# Patient Record
Sex: FEMALE | Race: WHITE | Employment: OTHER | URBAN - METROPOLITAN AREA
[De-identification: names, ages, dates, MRNs, and addresses within clinical notes are randomized per-mention and may not be internally consistent; named-entity substitution may affect disease eponyms.]

---

## 2021-09-14 DIAGNOSIS — M25.562 CHRONIC PAIN OF BOTH KNEES: Primary | ICD-10-CM

## 2021-09-14 DIAGNOSIS — G89.29 CHRONIC PAIN OF BOTH KNEES: Primary | ICD-10-CM

## 2021-09-14 DIAGNOSIS — M25.561 CHRONIC PAIN OF BOTH KNEES: Primary | ICD-10-CM

## 2021-09-15 ENCOUNTER — OFFICE VISIT (OUTPATIENT)
Dept: ORTHOPEDIC SURGERY | Age: 71
End: 2021-09-15
Payer: MEDICARE

## 2021-09-15 ENCOUNTER — HOSPITAL ENCOUNTER (OUTPATIENT)
Dept: GENERAL RADIOLOGY | Age: 71
Discharge: HOME | End: 2021-09-17
Payer: MEDICARE

## 2021-09-15 VITALS
WEIGHT: 259.8 LBS | OXYGEN SATURATION: 97 % | TEMPERATURE: 97.5 F | HEIGHT: 64 IN | BODY MASS INDEX: 44.35 KG/M2 | HEART RATE: 78 BPM | RESPIRATION RATE: 16 BRPM

## 2021-09-15 DIAGNOSIS — M17.11 PRIMARY OSTEOARTHRITIS OF RIGHT KNEE: Primary | ICD-10-CM

## 2021-09-15 DIAGNOSIS — M25.562 CHRONIC PAIN OF BOTH KNEES: ICD-10-CM

## 2021-09-15 DIAGNOSIS — M25.561 CHRONIC PAIN OF BOTH KNEES: ICD-10-CM

## 2021-09-15 DIAGNOSIS — M17.12 PRIMARY OSTEOARTHRITIS OF LEFT KNEE: ICD-10-CM

## 2021-09-15 DIAGNOSIS — G89.29 CHRONIC PAIN OF BOTH KNEES: ICD-10-CM

## 2021-09-15 PROCEDURE — 73565 X-RAY EXAM OF KNEES: CPT | Performed by: ORTHOPAEDIC SURGERY

## 2021-09-15 PROCEDURE — 20610 DRAIN/INJ JOINT/BURSA W/O US: CPT | Performed by: ORTHOPAEDIC SURGERY

## 2021-09-15 PROCEDURE — 99204 OFFICE O/P NEW MOD 45 MIN: CPT | Performed by: ORTHOPAEDIC SURGERY

## 2021-09-15 PROCEDURE — 73565 X-RAY EXAM OF KNEES: CPT

## 2021-09-15 RX ORDER — PHENOL 1.4 %
1 AEROSOL, SPRAY (ML) MUCOUS MEMBRANE DAILY
COMMUNITY

## 2021-09-15 RX ORDER — M-VIT,TX,IRON,MINS/CALC/FOLIC 27MG-0.4MG
1 TABLET ORAL DAILY
COMMUNITY

## 2021-09-15 RX ORDER — FAMOTIDINE 20 MG/1
20 TABLET, FILM COATED ORAL 2 TIMES DAILY
COMMUNITY

## 2021-09-15 RX ORDER — LIDOCAINE HYDROCHLORIDE 10 MG/ML
8 INJECTION, SOLUTION INFILTRATION; PERINEURAL ONCE
Status: SHIPPED | OUTPATIENT
Start: 2021-09-15

## 2021-09-15 RX ORDER — ATORVASTATIN CALCIUM 10 MG/1
10 TABLET, FILM COATED ORAL DAILY
COMMUNITY

## 2021-09-15 RX ORDER — LEVOTHYROXINE SODIUM 112 UG/1
112 TABLET ORAL DAILY
COMMUNITY

## 2021-09-15 RX ORDER — AMOXICILLIN 500 MG
CAPSULE ORAL
COMMUNITY

## 2021-09-15 RX ORDER — TRIAMCINOLONE ACETONIDE 40 MG/ML
80 INJECTION, SUSPENSION INTRA-ARTICULAR; INTRAMUSCULAR ONCE
Status: SHIPPED | OUTPATIENT
Start: 2021-09-15

## 2021-09-15 RX ORDER — VITAMIN B COMPLEX
1 CAPSULE ORAL DAILY
COMMUNITY

## 2021-09-15 RX ORDER — ACETAMINOPHEN 650 MG/1
650 SUPPOSITORY RECTAL EVERY 4 HOURS PRN
COMMUNITY

## 2021-09-15 RX ORDER — ACETAMINOPHEN 160 MG
TABLET,DISINTEGRATING ORAL
COMMUNITY

## 2021-09-15 ASSESSMENT — ENCOUNTER SYMPTOMS
EYE DISCHARGE: 0
EYE ITCHING: 0
COUGH: 0
SHORTNESS OF BREATH: 0
DIARRHEA: 0
ABDOMINAL PAIN: 0
CONSTIPATION: 0
EYE PAIN: 0

## 2021-09-15 NOTE — PROGRESS NOTES
Patient ID:  Rip Camacho Case is a 73 y.o. female who presents today for evaluation of bilateral knee pain. She has a longstanding history of bilateral knee osteoarthritis. She has had steroid injections in each knee, to her best recollection 3 on the left and 2 on the right, but that was a number of years ago. She has kidney issues and is avoiding anti-inflammatories.     Injury: no  Metal Allergy: no    Location: bilateral knee pain, located on the global aspect of the knee  Pain: yes; 7 on a scale of 1 to 10  Onset: gradual  Duration: 10+ years  Frequency:  occurs daily  Quality: aching and boring   Swelling: patient notes intermittent swelling of the joint  Aggravating factors: weight bearing activity, standing and walking  Alleviating factors: removing weight from leg and rest  Mechanical symptoms: catching and grinding  Radiation: no    Activities: walking independently  Restriction:  decreased ambulatory tolerance  Progression:  worsening    Previous treatment:  steroid injection   NSAIDs:  She has an elevated GFR and she is avoiding anti-inflammatories  PT:  none    Medications:    Current Outpatient Medications on File Prior to Visit   Medication Sig Dispense Refill    levothyroxine (SYNTHROID) 112 MCG tablet Take 112 mcg by mouth Daily      atorvastatin (LIPITOR) 10 MG tablet Take 10 mg by mouth daily Pt takes at night      calcium carbonate 600 MG TABS tablet Take 1 tablet by mouth daily      Multiple Vitamins-Minerals (THERAPEUTIC MULTIVITAMIN-MINERALS) tablet Take 1 tablet by mouth daily      Cholecalciferol (VITAMIN D3) 50 MCG (2000 UT) CAPS Take by mouth      b complex vitamins capsule Take 1 capsule by mouth daily w/ vit c      Omega-3 Fatty Acids (FISH OIL) 1200 MG CAPS Take by mouth      sodium chloride nebulizer 0.9 % NEBU 30 mL with albuterol (5 MG/ML) 0.5% NEBU Inhale into the lungs once As needed      famotidine (PEPCID) 20 MG tablet Take 20 mg by mouth 2 times daily      acetaminophen (TYLENOL) 650 MG suppository Place 650 mg rectally every 4 hours as needed for Fever As needed for pain       No current facility-administered medications on file prior to visit. Allergies: Allergies   Allergen Reactions    Omeprazole Diarrhea       Past Medical History:    History reviewed. No pertinent past medical history. Past Surgical History:    Past Surgical History:   Procedure Laterality Date    HYSTEROSCOPY  06/04/2021    w/ polypectomy    WRIST FRACTURE SURGERY  2005    left 2 pins placed       Social History:    Social History     Socioeconomic History    Marital status:      Spouse name: Not on file    Number of children: Not on file    Years of education: Not on file    Highest education level: Not on file   Occupational History    Not on file   Tobacco Use    Smoking status: Never Smoker    Smokeless tobacco: Never Used   Vaping Use    Vaping Use: Never used   Substance and Sexual Activity    Alcohol use: Yes     Comment: rare    Drug use: Not on file    Sexual activity: Not on file   Other Topics Concern    Not on file   Social History Narrative    Not on file     Social Determinants of Health     Financial Resource Strain:     Difficulty of Paying Living Expenses:    Food Insecurity:     Worried About Running Out of Food in the Last Year:     [address] in the Last Year:    Transportation Needs:     Lack of Transportation (Medical):      Lack of Transportation (Non-Medical):    Physical Activity:     Days of Exercise per Week:     Minutes of Exercise per Session:    Stress:     Feeling of Stress :    Social Connections:     Frequency of Communication with Friends and Family:     Frequency of Social Gatherings with Friends and Family:     Attends [employer] Services:     Active Member of Clubs or Organizations:     Attends Club or Organization Meetings:     Marital Status:    Intimate Partner Violence:     Fear of Current or Ex-Partner:     Emotionally Abused:     Physically Abused:     Sexually Abused:        Family History:    Family History   Problem Relation Age of Onset    Hypertension Mother     No Known Problems Father        Occupation:  retired   - Occupational requirements:  none    Workers Compensation:  Have you missed work for this issue?  no  Is this issue being addressed under a worker's comp claim? no    Review of Systems:    Review of Systems   Constitutional: Negative for activity change, appetite change and chills. HENT: Negative for congestion, ear pain and hearing loss. Eyes: Negative for pain, discharge and itching. Respiratory: Negative for cough and shortness of breath. Cardiovascular: Negative for chest pain and leg swelling. Gastrointestinal: Negative for abdominal pain, constipation and diarrhea. Endocrine: Negative for cold intolerance, heat intolerance and polydipsia. Genitourinary: Negative for difficulty urinating, flank pain and frequency. Skin: Negative for rash and wound. Allergic/Immunologic: Negative for environmental allergies and food allergies. Neurological: Negative for dizziness, seizures and syncope. Physical Exam:    Examination of the bilateral knee    Gait:  waddling due to bilateral pain  Inspection:  Valgus on the right and varus on the left  Swelling:  none  Erythema:  none  Ecchymosis:  none  Effusion:  1+  Palpation:  distal medial femoral condyle, distal lateral femoral condyle and Lateral joint space on the right medial joint space on the left  Extension:  5 degrees on the right and 6 degrees on the left  Flexion:  110 on the right 107 on the left  Strength:  5  Varus/Valgus Instability:  none  Anterior/Posterior Instability:  none  Corie:  positive  Thessaly:  positive  Modified Apley:  negative  Lachman:  negative  Patellar compression:  negative  Neurological/Vascular:  Sensation grossly intact.   Dorsalis pedis palpable and 2+    Radiographs:  Radiographs of the bilateral knee were personally reviewed, and they revealed:   XR KNEE BILATERAL STANDING    Result Date: 9/15/2021  Radiographs of the bilateral knees were personally reviewed. The patient has severe bilateral knee osteoarthritis. On the right side she has lateral compartment bone-on-bone contact with some medial joint space maintenance. She has significant patellofemoral osteophytes laterally. On the left side, the patient has medial compartment bone-on-bone contact with some lateral joint space maintenance. She also has significant patellofemoral osteophytes. No evidence of any fractures. No evidence of any bone or soft tissue lesions. MRI: None    Diagnosis:   Diagnosis Orders   1. Primary osteoarthritis of right knee  Ambulatory referral to Physical Therapy    [place] ARTHROCENTESIS ASPIR&/INJ MAJOR JT/BURSA W/O US   2. Primary osteoarthritis of left knee  Ambulatory referral to Physical Therapy    [place] ARTHROCENTESIS ASPIR&/INJ MAJOR JT/BURSA W/O US       Plan:    The patient has significant osteoarthritis of the knees bilaterally. Treatment options were discussed. Patient revealed that she had an abnormal EKG and needs to see a cardiologist.  Her BMI is presently 44. So, treatment options were discussed. Patient decided to try bilateral knee intra-articular steroid injections in conjunction with water therapy. This will provide her with an opportunity to exercise without joint reactive forces in an attempt to lose weight. This will also strengthen the muscles around the knee. She will follow-up as needed as the pain dictates. We did discuss weight loss strategies.     Time Out  [x] Patient Verified  [x] Site Verified  [x] Laterality Verified  [x] Procedure Verified    Before aspiration/injection risks/benefits of a cortisone injection including infection, local skin irritation, skin atrophy, continued pain/discomfort, elevated blood sugar, burning, failure to relieve pain, possible late infection were discussed with patient. Patient verbalized understanding and wanted to proceed with planned procedure. After prepping and draping the left knee in the usual sterile fashion, 1 cc of 40 mg/ml kenalog with 8 cc of 1% lidocaine were injected intra-articularly without any complications. Patient tolerated this well. After prepping and draping the right knee in the usual sterile fashion, 1 cc of 40 mg/ml kenalog with 8 cc of 1% lidocaine were injected intra-articularly without any complications. Patient tolerated this well. Post-procedure discomfort can be alleviated with additional medications/ice/elevation/rest over the first 24 hours as recommended. The patient tolerated the procedure well. If fluid was aspirated it was sent for further studies  in sterile specimen container as ordered to the lab    Orders Placed This Encounter   Procedures    Ambulatory referral to Physical Therapy     Referral Priority:   Routine     Referral Type:   Eval and Treat     Referral Reason:   Specialty Services Required     Requested Specialty:   Physical Therapy     Number of Visits Requested:   1    [place] ARTHROCENTESIS ASPIR&/INJ MAJOR JT/BURSA W/O US    [place] ARTHROCENTESIS ASPIR&/INJ MAJOR JT/BURSA W/O US       Orders Placed This Encounter   Medications    lidocaine 1 % injection 8 mL    triamcinolone acetonide (KENALOG-40) injection 80 mg    lidocaine 1 % injection 8 mL    triamcinolone acetonide (KENALOG-40) injection 80 mg       Return if symptoms worsen or fail to improve.     Marian Parr MD

## 2021-10-01 ENCOUNTER — HOSPITAL ENCOUNTER (OUTPATIENT)
Dept: PHYSICAL THERAPY | Age: 71
Setting detail: THERAPIES SERIES
Discharge: HOME | End: 2021-10-01
Payer: MEDICARE

## 2021-10-01 PROCEDURE — 97161 PT EVAL LOW COMPLEX 20 MIN: CPT

## 2021-10-01 NOTE — PROGRESS NOTES
[address]  PHYSICAL THERAPY EVALUATION    Date: 10/1/2021  Patient Name: Aristeo Bar       MRN: [id number]   Account: [de-identified]   : 1950  (73 y.o.)   Gender: female   Referring Practitioner: Jony Parra MD                 Diagnosis: primary OA of right knee; primary OA of left knee  Treatment Diagnosis: mari knee pain, decreased knee ROM mari, decreased LE strength mari, impaired gait and standing activity tolerance           Past Medical History:  has no past medical history on file. Past Surgical History:   has a past surgical history that includes hysteroscopy (2021) and Wrist fracture surgery (). Vital Signs  Patient Currently in Pain: No   Pain Screening  Patient Currently in Pain: No        Lives With: Spouse  Type of Home: House  Home Layout: Two level; Able to Live on Main level with bedroom/bathroom  Home Access: Stairs to enter with rails  Entrance Stairs - Number of Steps: 4  Entrance Stairs - Rails: Right  Bathroom Shower/Tub: Walk-in shower  Home Equipment: Cane (PRN)  ADL Assistance: Independent  Homemaking Assistance: Independent  Ambulation Assistance: Independent  Transfer Assistance: Independent  Active : Yes  Occupation: Retired  Type of occupation: L.P.N.  [address]: walking the dog, flower gardening  Additional Comments: standing tolerance: 10 minutes        Subjective:  Subjective: Pt reports ongoing chronic knee pain, L worse than R. Has been receiving conservative tx including cortisone injections. Denies falls over the last 6 months but has difficulty standing and ambulating for long periods of time. Pt recommended for aquatics to progress mari knee ROM and strength as well as secondary weight management in anticipation of knee replacement surgery. Pt recommended by MD to lose 30 pounds. Pt uses cane PRN for long community ambulation.        Objective:   Ambulation 1  Surface: carpet  Device: No Device  Assistance: Independent  Gait Deviations: Slow Shannan (lateral trunk sway)  Distance: 50-75ft intervals  Stairs  # Steps : 4  Rails: Bilateral  Device: No Device  Assistance: Modified independent   Comment: reciprocal ascending; leads down with LLE     Transfers  Sit to Stand: Independent  Stand to sit: Independent    Strength RLE  Strength RLE: Exception  R Hip Flexion: 4-/5  R Hip Extension: 3+/5  R Hip ABduction: 3+/5  R Hip ADduction: 3+/5  R Knee Flexion: 4-/5  R Knee Extension: 4-/5  Strength LLE  Strength LLE: Exception  L Hip Flexion: 4-/5  L Hip Extension: 3+/5  L Hip ABduction: 3+/5  L Hip ADduction: 3+/5  L Knee Flexion: 4-/5  L Knee Extension: 4-/5        Strength Other  Other: 5x STS: 11 seconds       AROM RLE (degrees)  RLE AROM: WFL  RLE General AROM: Knee: 3-95, supine (pain at end range flexion)     AROM LLE (degrees)  LLE AROM : WFL  LLE General AROM: Knee: 5-95, supine (pain at end range flexion)       Observation/Palpation  Observation: LE windswept L (R knee valgus, L knee varus)      Exercises:   Exercises  Exercise 1: *aquatics  Exercise 2: *gait laps  Exercise 3: *sink ex  Exercise 4: *knee flexion stretch at steps  Exercise 5: *deep water bicycles  Exercise 6: *deep water flutter kicks  Exercise 7: *step ups  Exercise 8: *squats  Exercise 9: *SLS/balance work  *Indicates exercise,modality, or manual techniques to be initiated when appropriate    Assessment: Body structures, Functions, Activity limitations: Increased pain, Decreased ROM, Decreased strength, Decreased balance, Decreased functional mobility , Decreased high-level IADLs  Assessment: Pt is a 72 yo female referred for aquatic therapy for mari knee OA. Goal to progress ROM, strength, and weight management in prep for future TKRs. Pt presents with impaired knee ROM and LE strength mari. Function gait tolerance limited to 10 minutes secondary to pain.  Pt can benefit from PT services to establish a personalized aquatic HEP to facilitate ROM, to continue upon discharge  Long term goal 2: Improve mari knee flexion ROM > 110 deg  Long term goal 3: Improve mari strength >/= 1/2 MMT grade for improved LE stability with gait and stair negotiation         PT Individual Minutes  Time In: 1426  Time Out: 1507  Minutes: 41     Procedure Minutes: 41 min eval       Electronically signed by Marc Estevez, PT on 10/1/21 at 3:14 PM EDT

## 2021-10-01 NOTE — PROGRESS NOTES
[address]     Ph: [phone]  Fax: [phone]    [x] Certification  [] Recertification [x]  Plan of Care  [] Progress Note [] Discharge      To:  Enma Schaefer MD      From:  Kendrick Albright, PT, DPT  Patient: Alondra Callahan Case     : 1950  Diagnosis: primary OA of right knee; primary OA of left knee     Date: 10/1/2021  Treatment Diagnosis: mari knee pain, decreased knee ROM mari, decreased LE strength mari, impaired gait and standing activity tolerance    Plan of Care/Certification Expiration Date: 21  Progress Report Period from:  10/1/2021  to 10/1/2021    Total # of Visits to Date: 1   No Show: 0    Canceled Appointment: 0     OBJECTIVE:   Long Term Goals - Time Frame for Long term goals : 4 weeks  Goals Current/ Discharge status Met   Long term goal 1: Independent with aquatic HEP to continue upon discharge HEP to be established [] yes  [x] no   Long term goal 2: Improve mari knee flexion ROM > 110 deg AROM RLE (degrees)  RLE AROM: WFL  RLE General AROM: Knee: 3-95, supine (pain at end range flexion)     AROM LLE (degrees)  LLE AROM : WFL  LLE General AROM: Knee: 5-95, supine (pain at end range flexion)     [] yes  [x] no   Long term goal 3: Improve mari strength >/= 1/2 MMT grade for improved LE stability with gait and stair negotiation Strength RLE  Strength RLE: Exception  R Hip Flexion: 4-/5  R Hip Extension: 3+/5  R Hip ABduction: 3+/5  R Hip ADduction: 3+/5  R Knee Flexion: 4-/5  R Knee Extension: 4-/5     Strength LLE  Strength LLE: Exception  L Hip Flexion: 4-/5  L Hip Extension: 3+/5  L Hip ABduction: 3+/5  L Hip ADduction: 3+/5  L Knee Flexion: 4-/5  L Knee Extension: 4-/5        Strength Other  Other: 5x STS: 11 seconds   [] yes  [x] no       Body structures, Functions, Activity limitations: Increased pain, Decreased ROM, Decreased strength, Decreased balance, Decreased functional mobility ,

## 2021-10-12 ENCOUNTER — HOSPITAL ENCOUNTER (OUTPATIENT)
Dept: PHYSICAL THERAPY | Age: 71
Setting detail: THERAPIES SERIES
Discharge: HOME | End: 2021-10-12
Payer: MEDICARE

## 2021-10-12 PROCEDURE — 97113 AQUATIC THERAPY/EXERCISES: CPT

## 2021-10-12 NOTE — PROGRESS NOTES
[address] [address]  Outpatient Physical Therapy    Treatment Note        Date: 10/12/2021  Patient: Davis Arzate Case  : 1950  ACCT #: [de-identified]  Referring Practitioner: Pattie Ruby MD  Diagnosis: primary OA of right knee; primary OA of left knee  Treatment Diagnosis: mari knee pain, decreased knee ROM mari, decreased LE strength mari, impaired gait and standing activity tolerance    Visit Information:  PT Visit Information  PT Insurance Information: [facility] Medicare  Total # of Visits to Date: 2  Plan of Care/Certification Expiration Date: 21  No Show: 0  Canceled Appointment: 0  Progress Note Counter:     Subjective: Pt report no pain at rest but with prolonged standing or walking will riase to 8/10 worst.     HEP Compliance:  [x] Good [] Fair [] Poor [] Reports not doing due to:    Vital Signs  Patient Currently in Pain: No   Pain Screening  Patient Currently in Pain: No    OBJECTIVE:   Exercises  Exercise 1: aquatics  Exercise 2: gait laps FLR x 3  Exercise 3: sink ex x 10  Exercise 4: knee flexion stretch at steps 30 sec x 3 B  Exercise 7: step ups F /L x 10          Strength: [x] NT  [] MMT completed:     ROM: [x] NT  [] ROM measurements:     *Indicates exercise, modality, or manual techniques to be initiated when appropriate    Assessment: Body structures, Functions, Activity limitations: Increased pain, Decreased ROM, Decreased strength, Decreased balance, Decreased functional mobility , Decreased high-level IADLs  Assessment: Intiated aquatic exercise program as stated in exercise section without complaint of pain post session. Did expirience pain with retro stepping which was eleviated with shorter strides.   Treatment Diagnosis: mari knee pain, decreased knee ROM mari, decreased LE strength mari, impaired gait and standing activity tolerance          Goals:       Long term goals  Time Frame for Long term goals : 4 weeks  Long term goal 1: Independent with aquatic HEP to continue upon discharge  Long term goal 2: Improve mari knee flexion ROM > 110 deg  Long term goal 3: Improve mari strength >/= 1/2 MMT grade for improved LE stability with gait and stair negotiation  Progress toward goals: Progressing towards goals. POST-PAIN       Pain Rating (0-10 pain scale):  0 /10   Location and pain description same as pre-treatment unless indicated. Action: [] NA   [] Perform HEP  [] Meds as prescribed  [] Modalities as prescribed   [] Call Physician     Frequency/Duration:  Plan  Times per week: 2  Plan weeks: 4  Current Treatment Recommendations: Aquatics, ROM, Strengthening, Balance Training, Home Exercise Program     Pt to continue current HEP. See objective section for any therapeutic exercise changes, additions or modifications this date.          PT Individual Minutes  Time In: 0163  Time Out: 1034  Minutes: 39  Timed Code Treatment Minutes: 39 Minutes  Procedure Minutes:0     Timed Activity Minutes Units   Aquatics 39 3     Signature:  Electronically signed by Pastor Medina PTA on 10/12/21 at 10:38 AM EDT

## 2021-10-15 ENCOUNTER — HOSPITAL ENCOUNTER (OUTPATIENT)
Dept: PHYSICAL THERAPY | Age: 71
Setting detail: THERAPIES SERIES
Discharge: HOME | End: 2021-10-15
Payer: MEDICARE

## 2021-10-15 PROCEDURE — 97113 AQUATIC THERAPY/EXERCISES: CPT

## 2021-10-15 NOTE — PROGRESS NOTES
[address] [address]  Outpatient Physical Therapy    Treatment Note        Date: 10/15/2021  Patient: Alpesh Pryor Case  : 1950  ACCT #: [de-identified]  Referring Practitioner: Narendra Rodriguez MD  Diagnosis: primary OA of right knee; primary OA of left knee       Visit Information:  PT Visit Information  PT Insurance Information: [place] [employer]  Total # of Visits to Date: 3  Plan of Care/Certification Expiration Date: 21  No Show: 0  Canceled Appointment: 0  Progress Note Counter: 3/9    Subjective: Patient denies pain/soreness after last visit. Continues to c/o difficulty ascending/descending stairs. HEP Compliance:  [x] Good [] Fair [] Poor [] Reports not doing due to:    Vital Signs  Patient Currently in Pain: No   Pain Screening  Patient Currently in Pain: No    OBJECTIVE:   Exercises  Exercise 1: aquatics  Exercise 2: gait laps FLR x 3  Exercise 3: sink ex x 10  Exercise 4: knee flexion stretch at steps 30 sec x 3 B  Exercise 5: deep water bicycles 5 minutes - without noodle  Exercise 6: deep water flutter kicks 2' forward/lateral  *Indicates exercise, modality, or manual techniques to be initiated when appropriate    Assessment:   Assessment: Continued aquatic program progressing exercises for bilateral knee ROM and strength. Patient denies pain with additional exercises. Goals:       Long term goals  Time Frame for Long term goals : 4 weeks  Long term goal 1: Independent with aquatic HEP to continue upon discharge  Long term goal 2: Improve mari knee flexion ROM > 110 deg  Long term goal 3: Improve mari strength >/= 1/2 MMT grade for improved LE stability with gait and stair negotiation  Progress toward goals:continue towards all     POST-PAIN       Pain Rating (0-10 pain scale):   0/10   Location and pain description same as pre-treatment unless indicated.    Action: [] NA   [] Perform HEP  [] Meds as prescribed  [] Modalities as prescribed   [] Call Physician     Frequency/Duration:  Plan  Times per

## 2021-10-19 ENCOUNTER — HOSPITAL ENCOUNTER (OUTPATIENT)
Dept: PHYSICAL THERAPY | Age: 71
Setting detail: THERAPIES SERIES
Discharge: HOME | End: 2021-10-19
Payer: MEDICARE

## 2021-10-19 PROCEDURE — 97113 AQUATIC THERAPY/EXERCISES: CPT

## 2021-10-19 ASSESSMENT — PAIN DESCRIPTION - PAIN TYPE: TYPE: CHRONIC PAIN

## 2021-10-19 ASSESSMENT — PAIN DESCRIPTION - DESCRIPTORS: DESCRIPTORS: ACHING

## 2021-10-19 ASSESSMENT — PAIN DESCRIPTION - LOCATION: LOCATION: KNEE

## 2021-10-19 ASSESSMENT — PAIN SCALES - GENERAL: PAINLEVEL_OUTOF10: 5

## 2021-10-19 ASSESSMENT — PAIN DESCRIPTION - ORIENTATION: ORIENTATION: RIGHT

## 2021-10-19 NOTE — PROGRESS NOTES
[address] [address]  Outpatient Physical Therapy    Treatment Note        Date: 10/19/2021  Patient: James Russell Case  : 1950  ACCT #: [de-identified]  Referring Practitioner: Maci Elam MD  Diagnosis: primary OA of right knee; primary OA of left knee       Visit Information:  PT Visit Information  PT Insurance Information: [facility] Medicare  Total # of Visits to Date: 4  Plan of Care/Certification Expiration Date: 21  No Show: 0  Canceled Appointment: 0  Progress Note Counter:     Subjective: Patient denies pain/soreness after last visit. Continues to c/o difficulty ascending/descending stairs. HEP Compliance:  [x] Good [] Fair [] Poor [] Reports not doing due to:    Vital Signs  Patient Currently in Pain: Yes   Pain Screening  Patient Currently in Pain: Yes  Pain Assessment  Pain Assessment: 0-10  Pain Level: 5  Pain Type: Chronic pain  Pain Location: Knee  Pain Orientation: Right  Pain Descriptors: Aching    OBJECTIVE:   Exercises  Exercise 1: aquatics  Exercise 2: gait laps FLR x 3  Exercise 3: sink ex x 10  Exercise 4: knee flexion stretch at steps 30 sec x 3 B  Exercise 5: deep water bicycles 5 minutes - without noodle  Exercise 6: deep water flutter kicks 2' forward/lateral         Strength: [x] NT  [] MMT completed:    ROM: [x] NT  [] ROM measurements:  Assessment: Body structures, Functions, Activity limitations: Increased pain, Decreased ROM, Decreased strength, Decreased balance, Decreased functional mobility , Decreased high-level IADLs  Assessment: Pt continued to progress through aquatic exercises this date with no pain reported throughout session. Pt required vc's and demonstrations this date for proper execution of  sink exercises.   Goals:    Long term goals  Time Frame for Long term goals : 4 weeks  Long term goal 1: Independent with aquatic HEP to continue upon discharge  Long term goal 2: Improve mari knee flexion ROM > 110 deg  Long term goal 3: Improve mari strength >/= 1/2 MMT grade for improved LE stability with gait and stair negotiation  Progress toward goals: Pt is progressing towards improving stability and ROM in LW    POST-PAIN       Pain Rating (0-10 pain scale):  0 /10   Location and pain description same as pre-treatment unless indicated. Action: [x] NA   [] Perform HEP  [] Meds as prescribed  [] Modalities as prescribed   [] Call Physician     Frequency/Duration:  Plan  Times per week: 2  Plan weeks: 4  Current Treatment Recommendations: Aquatics, ROM, Strengthening, Balance Training, Home Exercise Program     Pt to continue current HEP. See objective section for any therapeutic exercise changes, additions or modifications this date.          PT Individual Minutes  Time In: 6708  Time Out: 9858  Minutes: 34  Timed Code Treatment Minutes: 34 Minutes  Procedure Minutes:     Timed Activity Minutes Units   Aquatic Ther 34 2       Signature:  Electronically signed by Hiro Valdez PTA on 10/19/21 at 2:26 PM EDT

## 2021-10-22 ENCOUNTER — HOSPITAL ENCOUNTER (OUTPATIENT)
Dept: PHYSICAL THERAPY | Age: 71
Setting detail: THERAPIES SERIES
Discharge: HOME | End: 2021-10-22
Payer: MEDICARE

## 2021-10-22 PROCEDURE — 97113 AQUATIC THERAPY/EXERCISES: CPT

## 2021-10-22 ASSESSMENT — PAIN DESCRIPTION - ORIENTATION: ORIENTATION: RIGHT

## 2021-10-22 ASSESSMENT — PAIN DESCRIPTION - DESCRIPTORS: DESCRIPTORS: ACHING

## 2021-10-22 ASSESSMENT — PAIN SCALES - GENERAL: PAINLEVEL_OUTOF10: 3

## 2021-10-22 ASSESSMENT — PAIN DESCRIPTION - LOCATION: LOCATION: KNEE

## 2021-10-22 ASSESSMENT — PAIN DESCRIPTION - PAIN TYPE: TYPE: CHRONIC PAIN

## 2021-10-22 NOTE — PROGRESS NOTES
110 deg  Long term goal 3: Improve mari strength >/= 1/2 MMT grade for improved LE stability with gait and stair negotiation  Progress toward goals: Pt progressing towards goals 2 and 3    POST-PAIN       Pain Rating (0-10 pain scale):   /10   Location and pain description same as pre-treatment unless indicated. Action: [x] NA   [] Perform HEP  [] Meds as prescribed  [] Modalities as prescribed   [] Call Physician     Frequency/Duration:  Plan  Times per week: 2  Plan weeks: 4  Current Treatment Recommendations: Aquatics, ROM, Strengthening, Balance Training, Home Exercise Program     Pt to continue current HEP. See objective section for any therapeutic exercise changes, additions or modifications this date.          PT Individual Minutes  Time In: 2101  Time Out: 9808  Minutes: 39  Timed Code Treatment Minutes: 39 Minutes  Procedure Minutes:     Timed Activity Minutes Units   Aquatic 39 3     Signature:  Electronically signed by Lauri Rodriguez PTA on 10/22/21 at 1:35 PM EDT

## 2021-10-25 ENCOUNTER — HOSPITAL ENCOUNTER (OUTPATIENT)
Dept: PHYSICAL THERAPY | Age: 71
Setting detail: THERAPIES SERIES
Discharge: HOME | End: 2021-10-25
Payer: MEDICARE

## 2021-10-25 PROCEDURE — 97113 AQUATIC THERAPY/EXERCISES: CPT

## 2021-10-25 NOTE — PROGRESS NOTES
[address] [address]  Outpatient Physical Therapy    Treatment Note        Date: 10/25/2021  Patient: Nuris Roca Case  : 1950  ACCT #: [de-identified]  Referring Practitioner: Riki Wen MD  Diagnosis: primary OA of right knee; primary OA of left knee       Visit Information:  PT Visit Information  PT Insurance Information: [facility] Medicare  Total # of Visits to Date: 6  Plan of Care/Certification Expiration Date: 21  No Show: 0  Canceled Appointment: 0  Progress Note Counter:     Subjective: No pain currently while seated on pool bench however, pt states \"The had me doing the squats last time I was here, I was so sore after that. I felt clickling and grinding in both knees. \" Pt requesting to avoid this exercise. HEP Compliance:  [x] Good [] Fair [] Poor [] Reports not doing due to:    Vital Signs  Patient Currently in Pain: Denies   Pain Screening  Patient Currently in Pain: Denies    OBJECTIVE:   Exercises  Exercise 1: aquatics  Exercise 2: gait laps FLR x 3  Exercise 3: sink ex x 15  Exercise 4: knee flexion stretch at steps 30 sec x 3 B  Exercise 5: deep water bicycles 5 minutes - without noodle  Exercise 6: deep water flutter kicks 2' forward/lateral  Exercise 8: squats- pt requesting to hold D/T inc knee pain after LV. Strength: [x] NT  [] MMT completed:     ROM: [x] NT  [] ROM measurements:    Assessment: Body structures, Functions, Activity limitations: Increased pain, Decreased ROM, Decreased strength, Decreased balance, Decreased functional mobility , Decreased high-level IADLs  Assessment: Cont'd current pool program for inc B knee ROM and LE strength w/ avoidence of squatting ex to limit pain, good rose to tx. Pt requesting to be discharged from aquatic PT NV D/T closure of therapy pool beginning  (facility undergoing construction).  Pt was offerred option of \"big pool\" during select times or transition to land based PT, no interest. Appropriate HEP to be reviewed and provided NV. Pt [facility] [place] [facility] if wishing to utilize pool following D/C. Goals:   Long term goals  Time Frame for Long term goals : 4 weeks  Long term goal 1: Independent with aquatic HEP to continue upon discharge  Long term goal 2: Improve mari knee flexion ROM > 110 deg  Long term goal 3: Improve mari strength >/= 1/2 MMT grade for improved LE stability with gait and stair negotiation  Progress toward goals: B knee ROM/strength     POST-PAIN       Pain Rating (0-10 pain scale):   0/10   Location and pain description same as pre-treatment unless indicated. Action: [x] NA   [] Perform HEP  [] Meds as prescribed  [] Modalities as prescribed   [] Call Physician     Frequency/Duration:  Plan  Times per week: 2  Plan weeks: 4  Specific instructions for Next Treatment: D/C NV. (per pt request)  Current Treatment Recommendations: Aquatics, ROM, Strengthening, Balance Training, Home Exercise Program     Pt to continue current HEP. See objective section for any therapeutic exercise changes, additions or modifications this date.     PT Individual Minutes  Time In: 9765  Time Out: 5410  Minutes: 38  Timed Code Treatment Minutes: 38 Minutes  Procedure Minutes: N/A     Timed Activity Minutes Units   AQ 38 3     Signature:  Electronically signed by Russ Jackson PTA on 10/25/21 at 1:19 PM EDT

## 2021-10-28 ENCOUNTER — HOSPITAL ENCOUNTER (OUTPATIENT)
Dept: PHYSICAL THERAPY | Age: 71
Setting detail: THERAPIES SERIES
Discharge: HOME | End: 2021-10-28
Payer: MEDICARE

## 2021-10-28 PROCEDURE — 97140 MANUAL THERAPY 1/> REGIONS: CPT

## 2021-10-28 PROCEDURE — 97110 THERAPEUTIC EXERCISES: CPT

## 2021-10-28 NOTE — PROGRESS NOTES
15 min     Assessment: Body structures, Functions, Activity limitations: Increased pain, Decreased ROM, Decreased strength, Decreased balance, Decreased functional mobility , Decreased high-level IADLs  Assessment: Pt has completed 3 wks of aquatic therapy geared towards improving LE strength and B knee ROM to ease functional tolerance. Pt continuing to make good progress towards these goals however, wishing to D/C from skilled PT and continue on w/ indep program at this time. Pt verbalizing 75% function, inc self confidence/steadiness in community and ability to amb short distances around home w/o use of SC. Pt reports being well aware of her limitations and will to continue to be diligent w/ land/pool programs provided. Goals:   Long term goals  Time Frame for Long term goals : 4 weeks  Long term goal 1: Independent with aquatic HEP to continue upon discharge  Long term goal 2: Improve mari knee flexion ROM > 110 deg  Long term goal 3: Improve mari strength >/= 1/2 MMT grade for improved LE stability with gait and stair negotiation  Progress toward goals: Please refer to D/C note for details. POST-PAIN       Pain Rating (0-10 pain scale):   0/10   Location and pain description same as pre-treatment unless indicated. Action: [] NA   [x] Perform HEP  [] Meds as prescribed  [x] Modalities as prescribed   [] Call Physician     Frequency/Duration:  Plan  Plan Comment: D/C this date. Pt to continue current HEP. See objective section for any therapeutic exercise changes, additions or modifications this date.     PT Individual Minutes  Time In: 6381  Time Out: 9723  Minutes: 38  Timed Code Treatment Minutes: 38 Minutes  Procedure Minutes: N/A      Timed Activity Minutes Units   Ther Ex 23 2   Manual  15 1       Signature:  Electronically signed by Nay Murray PTA on 10/28/21 at 5:08 PM EDT

## 2021-10-28 NOTE — PROGRESS NOTES
[address]     Ph: [phone]  Fax: [phone]    [] Certification  [] Recertification []  Plan of Care  [] Progress Note [x] Discharge      To:  Ayanna Peoples MD      From:  Nelsy Smith, PT, DPT  Patient: Reyes Apa Case     : 1950  Diagnosis: primary OA of right knee; primary OA of left knee     Date: 10/28/2021       Plan of Care/Certification Expiration Date: 21  Progress Report Period from:  10/1/2021  to 10/28/2021    Total # of Visits to Date: 7   No Show: 0    Canceled Appointment: 0     OBJECTIVE:   Long Term Goals - Time Frame for Long term goals : 4 weeks  Goals Current/ Discharge status Met   Long term goal 1: Independent with aquatic HEP to continue upon discharge Indep/compliant w/ land/pool programs  [x] yes  [] no   Long term goal 2: Improve mari knee flexion ROM > 110 deg PROM RLE (degrees)  RLE General PROM: 100 deg flexion  AROM RLE (degrees)  RLE AROM: WFL  RLE General AROM: Knee: 0-98, supine (pain at end range flexion),  PROM LLE (degrees)  LLE General PROM: 100 deg flexion in seated  AROM LLE (degrees)  LLE AROM : WFL  LLE General AROM: Knee: 0-90, supine (no pain at end range flexion), 95 deg in seated   [x] yes  [x] no   Long term goal 3: Improve mari strength >/= 1/2 MMT grade for improved LE stability with gait and stair negotiation Strength RLE  Strength RLE: Exception  R Hip Flexion: 4/5  R Hip Extension: 4/5  R Hip ABduction: 4-/5  R Hip ADduction: 3+/5  R Knee Flexion: 4+/5  R Knee Extension: 4+/5  Strength LLE  Strength LLE: Exception  L Hip Flexion: 4-/5  L Hip Extension: 4/5  L Hip ABduction: 4+/5  L Hip ADduction: 3+/5  L Knee Flexion: 5/5  L Knee Extension: 4+/5     Strength Other  Other: 5x STS: 8.63 seconds [x] yes  [x] no     Body structures, Functions, Activity limitations: Increased pain, Decreased ROM, Decreased strength, Decreased balance, Decreased functional mobility , Decreased high-level IADLs  Assessment: Pt has completed 3 wks of aquatic therapy geared towards improving LE strength and B knee ROM to ease functional tolerance. Pt continuing to make good progress towards these goals however, wishing to D/C from skilled PT and continue on w/ indep program at this time. Pt verbalizing 75% function, inc self confidence/steadiness in community and ability to amb short distances around home w/o use of SC. Pt reports being well aware of her limitations and will to continue to be diligent w/ land/pool programs provided. Discharge Recommendations: Defer PT at this time    PLAN:   Frequency/Duration:  Plan  Plan Comment: D/C this date. Patient Status:[] Continue/ Initiate plan of Care    [x] Discharge PT. Recommend pt continue with HEP. [] Additional visits requested, Please re-certify for additional visits:     Signature: Objective measures completed by: Electronically signed by Lazara Macdonald PTA on 10/28/21 at 5:10 PM EDT  Signature: Electronically signed by Yamil Hairston PT on 10/29/2021 at 9:46 AM    If you have any questions or concerns, please don't hesitate to call. Thank you for your referral.    I have reviewed this plan of care and certify a need for medically necessary rehabilitation services.     Physician Signature:__________________________________________________________  Date:  Please sign and return

## 2022-05-20 ENCOUNTER — TELEPHONE (OUTPATIENT)
Dept: ORTHOPEDIC SURGERY | Age: 72
End: 2022-05-20

## 2022-05-20 DIAGNOSIS — M17.11 PRIMARY OSTEOARTHRITIS OF RIGHT KNEE: Primary | ICD-10-CM

## 2022-05-20 DIAGNOSIS — M17.12 PRIMARY OSTEOARTHRITIS OF LEFT KNEE: ICD-10-CM

## 2022-05-20 NOTE — TELEPHONE ENCOUNTER
Pt called requesting a pain medication because she has a wedding in a couple weeks and doesn't think she will be able to move around without it. She would like to know if she needs to be seen or if you can just send in a prescription?

## 2022-05-21 RX ORDER — TRAMADOL HYDROCHLORIDE 50 MG/1
50 TABLET ORAL EVERY 8 HOURS PRN
Qty: 21 TABLET | Refills: 0 | Status: SHIPPED | OUTPATIENT
Start: 2022-05-21 | End: 2022-05-28

## 2022-05-23 RX ORDER — METHYLPREDNISOLONE 4 MG/1
TABLET ORAL
Qty: 21 TABLET | Refills: 0 | Status: SHIPPED | OUTPATIENT
Start: 2022-05-23

## 2023-04-05 ENCOUNTER — OFFICE VISIT (OUTPATIENT)
Dept: ORTHOPEDIC SURGERY | Age: 73
End: 2023-04-05

## 2023-04-05 ENCOUNTER — HOSPITAL ENCOUNTER (OUTPATIENT)
Dept: GENERAL RADIOLOGY | Age: 73
Discharge: HOME | End: 2023-04-07
Payer: MEDICARE

## 2023-04-05 VITALS
WEIGHT: 241 LBS | BODY MASS INDEX: 41.15 KG/M2 | OXYGEN SATURATION: 96 % | HEIGHT: 64 IN | HEART RATE: 74 BPM | TEMPERATURE: 97.2 F

## 2023-04-05 DIAGNOSIS — E10.44 DIABETIC AMYOTROPHY ASSOCIATED WITH TYPE 1 DIABETES MELLITUS (HCC): ICD-10-CM

## 2023-04-05 DIAGNOSIS — M17.11 PRIMARY OSTEOARTHRITIS OF RIGHT KNEE: Primary | ICD-10-CM

## 2023-04-05 DIAGNOSIS — D69.6 THIN BLOOD (HCC): ICD-10-CM

## 2023-04-05 DIAGNOSIS — N30.01 ACUTE CYSTITIS WITH HEMATURIA: ICD-10-CM

## 2023-04-05 DIAGNOSIS — M17.11 PRIMARY OSTEOARTHRITIS OF RIGHT KNEE: ICD-10-CM

## 2023-04-05 PROCEDURE — 73564 X-RAY EXAM KNEE 4 OR MORE: CPT

## 2023-04-05 NOTE — PROGRESS NOTES
ORDERS:   Allergies: Lansoprazole, Meloxicam, and Omeprazole Latex Allergies:             Diabetic:           [] IV ________________________  [x] IV Start with J-loop     Preprinted Orders: Attached [] Yes [] No   ANTIBIOTIC PRE-OP: [x] ANCEF 2 gram IVPB if > 120 kg 3 grams IVPB within 1 hour of incision, if ALLERGIC, use VANCOMYCIN 1 gram IV, 2 hours pre-op  [x] TXA Protocol [] Other:   [x] NPO   [] Betablocker (if needed) _____________________________________   [x] Knee high anti-embolic hose [] Thigh high anti-embolic hose   Other: _________ chlorhexidine_____________________________________________    Physician Signature Required:    Date/Time: 4/5/2023

## 2023-04-17 ENCOUNTER — TELEPHONE (OUTPATIENT)
Dept: ORTHOPEDIC SURGERY | Age: 73
End: 2023-04-17

## 2023-04-17 NOTE — TELEPHONE ENCOUNTER
Patient had appointment with Dr. Meghan Sandoval (Cardiology) on 3/22/2023. Patient needs Stress Test and Echo. Appointment for both testing scheduled for 5/10/2023. Patient will see Dr. Meghan Sandoval after testing on 6/1/2023. At that time Dr. Meghan Sandoval will make the decision on Cardiac clearance.

## 2023-07-10 ENCOUNTER — TELEPHONE (OUTPATIENT)
Dept: ORTHOPEDIC SURGERY | Age: 73
End: 2023-07-10

## 2023-07-17 ENCOUNTER — OFFICE VISIT (OUTPATIENT)
Dept: ORTHOPEDIC SURGERY | Age: 73
End: 2023-07-17
Payer: MEDICARE

## 2023-07-17 ENCOUNTER — HOSPITAL ENCOUNTER (OUTPATIENT)
Dept: PREADMISSION TESTING | Age: 73
Discharge: HOME | End: 2023-07-21
Payer: MEDICARE

## 2023-07-17 VITALS
DIASTOLIC BLOOD PRESSURE: 70 MMHG | SYSTOLIC BLOOD PRESSURE: 116 MMHG | OXYGEN SATURATION: 99 % | HEART RATE: 66 BPM | BODY MASS INDEX: 42.52 KG/M2 | HEIGHT: 63 IN | WEIGHT: 240 LBS | TEMPERATURE: 97 F

## 2023-07-17 DIAGNOSIS — Z01.818 PREOP EXAMINATION: Primary | ICD-10-CM

## 2023-07-17 LAB
ABO + RH BLD: NORMAL
ANION GAP SERPL CALCULATED.3IONS-SCNC: 11 MEQ/L (ref 9–15)
BACTERIA URNS QL MICRO: NEGATIVE /HPF
BASOPHILS # BLD: 0 K/UL (ref 0–0.2)
BASOPHILS NFR BLD: 0.5 %
BILIRUB UR QL STRIP: NEGATIVE
BLD GP AB SCN SERPL QL: NORMAL
BUN SERPL-MCNC: 17 MG/DL (ref 8–23)
CALCIUM SERPL-MCNC: 9.4 MG/DL (ref 8.5–9.9)
CHLORIDE SERPL-SCNC: 103 MEQ/L (ref 95–107)
CLARITY UR: CLEAR
CO2 SERPL-SCNC: 28 MEQ/L (ref 20–31)
COLOR UR: YELLOW
CREAT SERPL-MCNC: 0.62 MG/DL (ref 0.5–0.9)
EOSINOPHIL # BLD: 0.1 K/UL (ref 0–0.7)
EOSINOPHIL NFR BLD: 1.2 %
EPI CELLS #/AREA URNS AUTO: ABNORMAL /HPF (ref 0–5)
ERYTHROCYTE [DISTWIDTH] IN BLOOD BY AUTOMATED COUNT: 13.7 % (ref 11.5–14.5)
GLUCOSE SERPL-MCNC: 91 MG/DL (ref 70–99)
GLUCOSE UR STRIP-MCNC: NEGATIVE MG/DL
HBA1C MFR BLD: 5.6 % (ref 4.8–5.9)
HCT VFR BLD AUTO: 42 % (ref 37–47)
HGB BLD-MCNC: 13.9 G/DL (ref 12–16)
HGB UR QL STRIP: NEGATIVE
HYALINE CASTS #/AREA URNS AUTO: ABNORMAL /HPF (ref 0–5)
INR PPP: 1
KETONES UR STRIP-MCNC: 15 MG/DL
LEUKOCYTE ESTERASE UR QL STRIP: ABNORMAL
LYMPHOCYTES # BLD: 0.7 K/UL (ref 1–4.8)
LYMPHOCYTES NFR BLD: 15.1 %
MCH RBC QN AUTO: 29.9 PG (ref 27–31.3)
MCHC RBC AUTO-ENTMCNC: 33.1 % (ref 33–37)
MCV RBC AUTO: 90.3 FL (ref 79.4–94.8)
MONOCYTES # BLD: 0.4 K/UL (ref 0.2–0.8)
MONOCYTES NFR BLD: 9 %
NEUTROPHILS # BLD: 3.4 K/UL (ref 1.4–6.5)
NEUTS SEG NFR BLD: 74.2 %
NITRITE UR QL STRIP: NEGATIVE
PH UR STRIP: 5.5 [PH] (ref 5–9)
PLATELET # BLD AUTO: 207 K/UL (ref 130–400)
POTASSIUM SERPL-SCNC: 3.8 MEQ/L (ref 3.4–4.9)
PREALB SERPL-MCNC: 24.3 MG/DL (ref 20–40)
PROT UR STRIP-MCNC: NEGATIVE MG/DL
PROTHROMBIN TIME: 12.9 SEC (ref 12.3–14.9)
RBC # BLD AUTO: 4.65 M/UL (ref 4.2–5.4)
RBC #/AREA URNS AUTO: ABNORMAL /HPF (ref 0–5)
SODIUM SERPL-SCNC: 142 MEQ/L (ref 135–144)
SP GR UR STRIP: 1.02 (ref 1–1.03)
UROBILINOGEN UR STRIP-ACNC: 0.2 E.U./DL
WBC # BLD AUTO: 4.6 K/UL (ref 4.8–10.8)
WBC #/AREA URNS AUTO: ABNORMAL /HPF (ref 0–5)

## 2023-07-17 PROCEDURE — 80048 BASIC METABOLIC PNL TOTAL CA: CPT

## 2023-07-17 PROCEDURE — 84466 ASSAY OF TRANSFERRIN: CPT

## 2023-07-17 PROCEDURE — 85025 COMPLETE CBC W/AUTO DIFF WBC: CPT

## 2023-07-17 PROCEDURE — 93010 ELECTROCARDIOGRAM REPORT: CPT | Performed by: PHYSICIAN ASSISTANT

## 2023-07-17 PROCEDURE — 86901 BLOOD TYPING SEROLOGIC RH(D): CPT

## 2023-07-17 PROCEDURE — PREOPEXAM PRE-OP EXAM: Performed by: PHYSICIAN ASSISTANT

## 2023-07-17 PROCEDURE — 93000 ELECTROCARDIOGRAM COMPLETE: CPT | Performed by: PHYSICIAN ASSISTANT

## 2023-07-17 PROCEDURE — 87086 URINE CULTURE/COLONY COUNT: CPT

## 2023-07-17 PROCEDURE — 86900 BLOOD TYPING SEROLOGIC ABO: CPT

## 2023-07-17 PROCEDURE — 83036 HEMOGLOBIN GLYCOSYLATED A1C: CPT

## 2023-07-17 PROCEDURE — 87641 MR-STAPH DNA AMP PROBE: CPT

## 2023-07-17 PROCEDURE — 81001 URINALYSIS AUTO W/SCOPE: CPT

## 2023-07-17 PROCEDURE — 85610 PROTHROMBIN TIME: CPT

## 2023-07-17 PROCEDURE — 84134 ASSAY OF PREALBUMIN: CPT

## 2023-07-17 PROCEDURE — 86850 RBC ANTIBODY SCREEN: CPT

## 2023-07-17 RX ORDER — CHLORHEXIDINE GLUCONATE 213 G/1000ML
SOLUTION TOPICAL
Qty: 118 ML | Refills: 0 | Status: SHIPPED | OUTPATIENT
Start: 2023-07-17

## 2023-07-17 RX ORDER — SODIUM CHLORIDE 0.9 % (FLUSH) 0.9 %
5-40 SYRINGE (ML) INJECTION EVERY 12 HOURS SCHEDULED
OUTPATIENT
Start: 2023-07-24

## 2023-07-17 RX ORDER — SODIUM CHLORIDE 9 MG/ML
INJECTION, SOLUTION INTRAVENOUS PRN
OUTPATIENT
Start: 2023-07-24

## 2023-07-17 RX ORDER — SODIUM CHLORIDE, SODIUM LACTATE, POTASSIUM CHLORIDE, CALCIUM CHLORIDE 600; 310; 30; 20 MG/100ML; MG/100ML; MG/100ML; MG/100ML
INJECTION, SOLUTION INTRAVENOUS CONTINUOUS
OUTPATIENT
Start: 2023-07-24

## 2023-07-17 RX ORDER — SODIUM CHLORIDE 0.9 % (FLUSH) 0.9 %
5-40 SYRINGE (ML) INJECTION PRN
OUTPATIENT
Start: 2023-07-24

## 2023-07-17 NOTE — H&P
acknowledged the explanation, agreed to proceed with the plan and a consent was signed. Explained to the patient that she needs to bathe daily with Hibiclens soap for 3 days prior to surgery. She understands to be n.p.o. midnight the night before surgery. She will bring her walker with her the morning of surgery.

## 2023-07-18 LAB
BACTERIA UR CULT: NORMAL
TRANSFERRIN SERPL-MCNC: 328 MG/DL (ref 200–360)

## 2023-07-19 LAB
MRSA, DNA, NASAL: NEGATIVE
SPECIMEN DESCRIPTION: NORMAL

## 2023-07-21 NOTE — DISCHARGE INSTRUCTIONS
Dr. Elaine Mccabe., MD  [facility]    Post Operative Instructions for Total Knee Replacement    Incision and dressing  Your incision is covered with a waterproof Aquacel dressing. It has been impregnated with silver nitrate to help rey off infection. The dressing is to be removed 7 days after the date of your surgery. The incision has been closed with skin glue. The glue will flake off over time and fall off on its own. DO NOT apply any lotions, creams, peroxide or Betadine to the skin glue, as it will degrade and dissolve the glue. DO NOT peel the glue off, as this could result in opening of the incision. There are no sutures that need to be removed; the skin and subcutaneous layers have been closed with dissolvable sutures, which will dissolve over 7 to 8 weeks. Showering  The Aquacel dressing is waterproof. You may shower when you feel ready. Once the Aquacel dressing has been removed, you may continue to shower. The glue provides a waterproof seal to the incision. Let the water run over the incision, and pat dry with a towel. Do not scrub or rub the glue. Compression stockings  The compression stockings/RICKIE hose are used to help reduce swelling and assist in the prevention of blood clots. They are to be worn on the operative leg for 3 weeks. They should be worn full-time during the day, but may be removed at nighttime or during periods of elevation during the day. They are optional on the nonoperative leg, depending on how much swelling may be encountered. Activity  You will begin activity immediately after surgery. Physical therapy will commence (at home or as an outpatient) within a few days of surgery. You may bear weight on the operative leg as tolerated. It is encouraged that you get up for short walks frequently throughout the day.   Pump your ankles up and down at least 10 times every hour while you are awake, or if you are standing, stand on your toes 10 times

## 2023-07-24 ENCOUNTER — ANESTHESIA EVENT (OUTPATIENT)
Dept: OPERATING ROOM | Age: 73
End: 2023-07-24
Payer: MEDICARE

## 2023-07-24 ENCOUNTER — HOSPITAL ENCOUNTER (OUTPATIENT)
Age: 73
Discharge: HOME HEALTH | End: 2023-07-25
Attending: ORTHOPAEDIC SURGERY | Admitting: ORTHOPAEDIC SURGERY
Payer: MEDICARE

## 2023-07-24 ENCOUNTER — ANESTHESIA (OUTPATIENT)
Dept: OPERATING ROOM | Age: 73
End: 2023-07-24
Payer: MEDICARE

## 2023-07-24 ENCOUNTER — APPOINTMENT (OUTPATIENT)
Dept: ULTRASOUND IMAGING | Age: 73
End: 2023-07-24
Attending: ORTHOPAEDIC SURGERY
Payer: MEDICARE

## 2023-07-24 ENCOUNTER — APPOINTMENT (OUTPATIENT)
Dept: GENERAL RADIOLOGY | Age: 73
End: 2023-07-24
Attending: ORTHOPAEDIC SURGERY
Payer: MEDICARE

## 2023-07-24 DIAGNOSIS — Z96.651 S/P TOTAL KNEE ARTHROPLASTY, RIGHT: Primary | ICD-10-CM

## 2023-07-24 PROCEDURE — 3700000000 HC ANESTHESIA ATTENDED CARE: Performed by: ORTHOPAEDIC SURGERY

## 2023-07-24 PROCEDURE — 97166 OT EVAL MOD COMPLEX 45 MIN: CPT

## 2023-07-24 PROCEDURE — 3600000004 HC SURGERY LEVEL 4 BASE: Performed by: ORTHOPAEDIC SURGERY

## 2023-07-24 PROCEDURE — 97116 GAIT TRAINING THERAPY: CPT

## 2023-07-24 PROCEDURE — 2580000003 HC RX 258: Performed by: NURSE PRACTITIONER

## 2023-07-24 PROCEDURE — 73560 X-RAY EXAM OF KNEE 1 OR 2: CPT

## 2023-07-24 PROCEDURE — A4217 STERILE WATER/SALINE, 500 ML: HCPCS | Performed by: ORTHOPAEDIC SURGERY

## 2023-07-24 PROCEDURE — G0378 HOSPITAL OBSERVATION PER HR: HCPCS

## 2023-07-24 PROCEDURE — 7100000000 HC PACU RECOVERY - FIRST 15 MIN: Performed by: ORTHOPAEDIC SURGERY

## 2023-07-24 PROCEDURE — 6360000002 HC RX W HCPCS: Performed by: STUDENT IN AN ORGANIZED HEALTH CARE EDUCATION/TRAINING PROGRAM

## 2023-07-24 PROCEDURE — 6370000000 HC RX 637 (ALT 250 FOR IP): Performed by: NURSE PRACTITIONER

## 2023-07-24 PROCEDURE — 97530 THERAPEUTIC ACTIVITIES: CPT

## 2023-07-24 PROCEDURE — 2709999900 HC NON-CHARGEABLE SUPPLY: Performed by: ORTHOPAEDIC SURGERY

## 2023-07-24 PROCEDURE — 6360000002 HC RX W HCPCS: Performed by: PHYSICIAN ASSISTANT

## 2023-07-24 PROCEDURE — 97162 PT EVAL MOD COMPLEX 30 MIN: CPT

## 2023-07-24 PROCEDURE — 3600000014 HC SURGERY LEVEL 4 ADDTL 15MIN: Performed by: ORTHOPAEDIC SURGERY

## 2023-07-24 PROCEDURE — 7100000001 HC PACU RECOVERY - ADDTL 15 MIN: Performed by: ORTHOPAEDIC SURGERY

## 2023-07-24 PROCEDURE — 27447 TOTAL KNEE ARTHROPLASTY: CPT | Performed by: ORTHOPAEDIC SURGERY

## 2023-07-24 PROCEDURE — 97535 SELF CARE MNGMENT TRAINING: CPT

## 2023-07-24 PROCEDURE — 2580000003 HC RX 258: Performed by: PHYSICIAN ASSISTANT

## 2023-07-24 PROCEDURE — 6360000002 HC RX W HCPCS: Performed by: NURSE PRACTITIONER

## 2023-07-24 PROCEDURE — 2500000003 HC RX 250 WO HCPCS: Performed by: NURSE PRACTITIONER

## 2023-07-24 PROCEDURE — 6370000000 HC RX 637 (ALT 250 FOR IP): Performed by: PHYSICIAN ASSISTANT

## 2023-07-24 PROCEDURE — 27447 TOTAL KNEE ARTHROPLASTY: CPT | Performed by: PHYSICIAN ASSISTANT

## 2023-07-24 PROCEDURE — 2580000003 HC RX 258: Performed by: STUDENT IN AN ORGANIZED HEALTH CARE EDUCATION/TRAINING PROGRAM

## 2023-07-24 PROCEDURE — 76942 ECHO GUIDE FOR BIOPSY: CPT

## 2023-07-24 PROCEDURE — C1776 JOINT DEVICE (IMPLANTABLE): HCPCS | Performed by: ORTHOPAEDIC SURGERY

## 2023-07-24 PROCEDURE — 2580000003 HC RX 258: Performed by: ORTHOPAEDIC SURGERY

## 2023-07-24 PROCEDURE — 64447 NJX AA&/STRD FEMORAL NRV IMG: CPT | Performed by: STUDENT IN AN ORGANIZED HEALTH CARE EDUCATION/TRAINING PROGRAM

## 2023-07-24 PROCEDURE — 6370000000 HC RX 637 (ALT 250 FOR IP): Performed by: INTERNAL MEDICINE

## 2023-07-24 PROCEDURE — 3700000001 HC ADD 15 MINUTES (ANESTHESIA): Performed by: ORTHOPAEDIC SURGERY

## 2023-07-24 DEVICE — ASYMMETRIC PATELLA
Type: IMPLANTABLE DEVICE | Site: KNEE | Status: FUNCTIONAL
Brand: TRIATHLON

## 2023-07-24 DEVICE — CRUCIATE RETAINING FEMORAL
Type: IMPLANTABLE DEVICE | Site: KNEE | Status: FUNCTIONAL
Brand: TRIATHLON

## 2023-07-24 DEVICE — PRIMARY TIBIAL BASEPLATE
Type: IMPLANTABLE DEVICE | Site: KNEE | Status: FUNCTIONAL
Brand: TRIATHLON

## 2023-07-24 DEVICE — IMPLANTABLE DEVICE: Type: IMPLANTABLE DEVICE | Site: KNEE | Status: FUNCTIONAL

## 2023-07-24 DEVICE — CEMENT BNE RADIOPAQUE FAST SET ACRYL RESIN HI VISC SIMPLEXHV: Type: IMPLANTABLE DEVICE | Site: KNEE | Status: FUNCTIONAL

## 2023-07-24 DEVICE — COMPONENT PART KNEE CAPPED K1 STRYKER: Type: IMPLANTABLE DEVICE | Site: KNEE | Status: FUNCTIONAL

## 2023-07-24 RX ORDER — OXYCODONE HYDROCHLORIDE 5 MG/1
10 TABLET ORAL PRN
Status: DISCONTINUED | OUTPATIENT
Start: 2023-07-24 | End: 2023-07-24 | Stop reason: HOSPADM

## 2023-07-24 RX ORDER — BUPIVACAINE HYDROCHLORIDE 7.5 MG/ML
INJECTION, SOLUTION INTRASPINAL
Status: COMPLETED | OUTPATIENT
Start: 2023-07-24 | End: 2023-07-24

## 2023-07-24 RX ORDER — PROCHLORPERAZINE EDISYLATE 5 MG/ML
5 INJECTION INTRAMUSCULAR; INTRAVENOUS
Status: DISCONTINUED | OUTPATIENT
Start: 2023-07-24 | End: 2023-07-24 | Stop reason: HOSPADM

## 2023-07-24 RX ORDER — SODIUM CHLORIDE 0.9 % (FLUSH) 0.9 %
5-40 SYRINGE (ML) INJECTION PRN
Status: DISCONTINUED | OUTPATIENT
Start: 2023-07-24 | End: 2023-07-25 | Stop reason: HOSPADM

## 2023-07-24 RX ORDER — ATORVASTATIN CALCIUM 10 MG/1
10 TABLET, FILM COATED ORAL DAILY
Status: DISCONTINUED | OUTPATIENT
Start: 2023-07-24 | End: 2023-07-24

## 2023-07-24 RX ORDER — MEPERIDINE HYDROCHLORIDE 25 MG/ML
12.5 INJECTION INTRAMUSCULAR; INTRAVENOUS; SUBCUTANEOUS EVERY 5 MIN PRN
Status: DISCONTINUED | OUTPATIENT
Start: 2023-07-24 | End: 2023-07-24 | Stop reason: HOSPADM

## 2023-07-24 RX ORDER — KETOROLAC TROMETHAMINE 15 MG/ML
15 INJECTION, SOLUTION INTRAMUSCULAR; INTRAVENOUS EVERY 6 HOURS
Status: COMPLETED | OUTPATIENT
Start: 2023-07-24 | End: 2023-07-25

## 2023-07-24 RX ORDER — PROPOFOL 10 MG/ML
INJECTION, EMULSION INTRAVENOUS PRN
Status: DISCONTINUED | OUTPATIENT
Start: 2023-07-24 | End: 2023-07-24 | Stop reason: SDUPTHER

## 2023-07-24 RX ORDER — SODIUM CHLORIDE 0.9 % (FLUSH) 0.9 %
5-40 SYRINGE (ML) INJECTION PRN
Status: DISCONTINUED | OUTPATIENT
Start: 2023-07-24 | End: 2023-07-24

## 2023-07-24 RX ORDER — FENTANYL CITRATE 0.05 MG/ML
25 INJECTION, SOLUTION INTRAMUSCULAR; INTRAVENOUS EVERY 5 MIN PRN
Status: DISCONTINUED | OUTPATIENT
Start: 2023-07-24 | End: 2023-07-24 | Stop reason: HOSPADM

## 2023-07-24 RX ORDER — FAMOTIDINE 20 MG/1
20 TABLET, FILM COATED ORAL ONCE
Status: COMPLETED | OUTPATIENT
Start: 2023-07-24 | End: 2023-07-24

## 2023-07-24 RX ORDER — LABETALOL HYDROCHLORIDE 5 MG/ML
10 INJECTION, SOLUTION INTRAVENOUS
Status: DISCONTINUED | OUTPATIENT
Start: 2023-07-24 | End: 2023-07-24 | Stop reason: HOSPADM

## 2023-07-24 RX ORDER — ACETAMINOPHEN 325 MG/1
650 TABLET ORAL EVERY 6 HOURS SCHEDULED
Status: DISCONTINUED | OUTPATIENT
Start: 2023-07-24 | End: 2023-07-25 | Stop reason: HOSPADM

## 2023-07-24 RX ORDER — OXYCODONE HYDROCHLORIDE 5 MG/1
5 TABLET ORAL EVERY 4 HOURS PRN
Status: DISCONTINUED | OUTPATIENT
Start: 2023-07-24 | End: 2023-07-24 | Stop reason: SDUPTHER

## 2023-07-24 RX ORDER — HYDRALAZINE HYDROCHLORIDE 20 MG/ML
10 INJECTION INTRAMUSCULAR; INTRAVENOUS
Status: DISCONTINUED | OUTPATIENT
Start: 2023-07-24 | End: 2023-07-24 | Stop reason: HOSPADM

## 2023-07-24 RX ORDER — SCOLOPAMINE TRANSDERMAL SYSTEM 1 MG/1
1 PATCH, EXTENDED RELEASE TRANSDERMAL ONCE
Status: DISCONTINUED | OUTPATIENT
Start: 2023-07-24 | End: 2023-07-25 | Stop reason: HOSPADM

## 2023-07-24 RX ORDER — POLYETHYLENE GLYCOL 3350 17 G/17G
17 POWDER, FOR SOLUTION ORAL DAILY PRN
Status: DISCONTINUED | OUTPATIENT
Start: 2023-07-24 | End: 2023-07-24 | Stop reason: SDUPTHER

## 2023-07-24 RX ORDER — OXYCODONE HYDROCHLORIDE 5 MG/1
5 TABLET ORAL PRN
Status: DISCONTINUED | OUTPATIENT
Start: 2023-07-24 | End: 2023-07-24 | Stop reason: HOSPADM

## 2023-07-24 RX ORDER — SENNA AND DOCUSATE SODIUM 50; 8.6 MG/1; MG/1
1 TABLET, FILM COATED ORAL 2 TIMES DAILY
Status: DISCONTINUED | OUTPATIENT
Start: 2023-07-24 | End: 2023-07-25 | Stop reason: HOSPADM

## 2023-07-24 RX ORDER — ONDANSETRON 2 MG/ML
4 INJECTION INTRAMUSCULAR; INTRAVENOUS
Status: COMPLETED | OUTPATIENT
Start: 2023-07-24 | End: 2023-07-24

## 2023-07-24 RX ORDER — SODIUM CHLORIDE 9 MG/ML
INJECTION, SOLUTION INTRAVENOUS PRN
Status: DISCONTINUED | OUTPATIENT
Start: 2023-07-24 | End: 2023-07-25 | Stop reason: HOSPADM

## 2023-07-24 RX ORDER — ONDANSETRON 4 MG/1
4 TABLET, ORALLY DISINTEGRATING ORAL EVERY 8 HOURS PRN
Status: DISCONTINUED | OUTPATIENT
Start: 2023-07-24 | End: 2023-07-24 | Stop reason: SDUPTHER

## 2023-07-24 RX ORDER — FENTANYL CITRATE 0.05 MG/ML
50 INJECTION, SOLUTION INTRAMUSCULAR; INTRAVENOUS EVERY 5 MIN PRN
Status: DISCONTINUED | OUTPATIENT
Start: 2023-07-24 | End: 2023-07-24 | Stop reason: HOSPADM

## 2023-07-24 RX ORDER — SODIUM CHLORIDE 0.9 % (FLUSH) 0.9 %
5-40 SYRINGE (ML) INJECTION EVERY 12 HOURS SCHEDULED
Status: DISCONTINUED | OUTPATIENT
Start: 2023-07-24 | End: 2023-07-24

## 2023-07-24 RX ORDER — OXYCODONE HYDROCHLORIDE 5 MG/1
5 TABLET ORAL EVERY 4 HOURS PRN
Status: DISCONTINUED | OUTPATIENT
Start: 2023-07-24 | End: 2023-07-25 | Stop reason: HOSPADM

## 2023-07-24 RX ORDER — SENNA AND DOCUSATE SODIUM 50; 8.6 MG/1; MG/1
1 TABLET, FILM COATED ORAL 2 TIMES DAILY
Status: DISCONTINUED | OUTPATIENT
Start: 2023-07-24 | End: 2023-07-24 | Stop reason: SDUPTHER

## 2023-07-24 RX ORDER — SODIUM CHLORIDE, SODIUM LACTATE, POTASSIUM CHLORIDE, CALCIUM CHLORIDE 600; 310; 30; 20 MG/100ML; MG/100ML; MG/100ML; MG/100ML
INJECTION, SOLUTION INTRAVENOUS CONTINUOUS PRN
Status: DISCONTINUED | OUTPATIENT
Start: 2023-07-24 | End: 2023-07-24 | Stop reason: SDUPTHER

## 2023-07-24 RX ORDER — ACETAMINOPHEN 325 MG/1
650 TABLET ORAL EVERY 6 HOURS
Status: DISCONTINUED | OUTPATIENT
Start: 2023-07-24 | End: 2023-07-24 | Stop reason: SDUPTHER

## 2023-07-24 RX ORDER — ONDANSETRON 2 MG/ML
4 INJECTION INTRAMUSCULAR; INTRAVENOUS EVERY 6 HOURS PRN
Status: DISCONTINUED | OUTPATIENT
Start: 2023-07-24 | End: 2023-07-25 | Stop reason: HOSPADM

## 2023-07-24 RX ORDER — DIPHENHYDRAMINE HYDROCHLORIDE 50 MG/ML
12.5 INJECTION INTRAMUSCULAR; INTRAVENOUS
Status: ACTIVE | OUTPATIENT
Start: 2023-07-24 | End: 2023-07-25

## 2023-07-24 RX ORDER — OXYCODONE HYDROCHLORIDE 5 MG/1
10 TABLET ORAL EVERY 4 HOURS PRN
Status: DISCONTINUED | OUTPATIENT
Start: 2023-07-24 | End: 2023-07-24 | Stop reason: SDUPTHER

## 2023-07-24 RX ORDER — ROPIVACAINE HYDROCHLORIDE 5 MG/ML
INJECTION, SOLUTION EPIDURAL; INFILTRATION; PERINEURAL PRN
Status: DISCONTINUED | OUTPATIENT
Start: 2023-07-24 | End: 2023-07-24 | Stop reason: SDUPTHER

## 2023-07-24 RX ORDER — ASPIRIN 81 MG/1
81 TABLET ORAL 2 TIMES DAILY
Status: DISCONTINUED | OUTPATIENT
Start: 2023-07-24 | End: 2023-07-24 | Stop reason: SDUPTHER

## 2023-07-24 RX ORDER — SODIUM CHLORIDE 0.9 % (FLUSH) 0.9 %
5-40 SYRINGE (ML) INJECTION EVERY 12 HOURS SCHEDULED
Status: DISCONTINUED | OUTPATIENT
Start: 2023-07-24 | End: 2023-07-25 | Stop reason: HOSPADM

## 2023-07-24 RX ORDER — OXYCODONE HYDROCHLORIDE 5 MG/1
10 TABLET ORAL EVERY 4 HOURS PRN
Status: DISCONTINUED | OUTPATIENT
Start: 2023-07-24 | End: 2023-07-25 | Stop reason: HOSPADM

## 2023-07-24 RX ORDER — TIZANIDINE 2 MG/1
2 TABLET ORAL EVERY 6 HOURS PRN
Status: DISCONTINUED | OUTPATIENT
Start: 2023-07-24 | End: 2023-07-25 | Stop reason: HOSPADM

## 2023-07-24 RX ORDER — SODIUM CHLORIDE 9 MG/ML
INJECTION, SOLUTION INTRAVENOUS PRN
Status: DISCONTINUED | OUTPATIENT
Start: 2023-07-24 | End: 2023-07-24 | Stop reason: SDUPTHER

## 2023-07-24 RX ORDER — ACETAMINOPHEN 500 MG
1000 TABLET ORAL ONCE
Status: COMPLETED | OUTPATIENT
Start: 2023-07-24 | End: 2023-07-24

## 2023-07-24 RX ORDER — SODIUM CHLORIDE 0.9 % (FLUSH) 0.9 %
5-40 SYRINGE (ML) INJECTION PRN
Status: DISCONTINUED | OUTPATIENT
Start: 2023-07-24 | End: 2023-07-24 | Stop reason: SDUPTHER

## 2023-07-24 RX ORDER — SODIUM CHLORIDE, SODIUM LACTATE, POTASSIUM CHLORIDE, CALCIUM CHLORIDE 600; 310; 30; 20 MG/100ML; MG/100ML; MG/100ML; MG/100ML
INJECTION, SOLUTION INTRAVENOUS CONTINUOUS
Status: DISCONTINUED | OUTPATIENT
Start: 2023-07-24 | End: 2023-07-24

## 2023-07-24 RX ORDER — MIDAZOLAM HYDROCHLORIDE 1 MG/ML
INJECTION INTRAMUSCULAR; INTRAVENOUS PRN
Status: DISCONTINUED | OUTPATIENT
Start: 2023-07-24 | End: 2023-07-24 | Stop reason: SDUPTHER

## 2023-07-24 RX ORDER — ASPIRIN 81 MG/1
81 TABLET ORAL 2 TIMES DAILY
Status: DISCONTINUED | OUTPATIENT
Start: 2023-07-24 | End: 2023-07-25 | Stop reason: HOSPADM

## 2023-07-24 RX ORDER — MAGNESIUM HYDROXIDE 1200 MG/15ML
LIQUID ORAL CONTINUOUS PRN
Status: DISCONTINUED | OUTPATIENT
Start: 2023-07-24 | End: 2023-07-24 | Stop reason: HOSPADM

## 2023-07-24 RX ORDER — SODIUM CHLORIDE, SODIUM LACTATE, POTASSIUM CHLORIDE, CALCIUM CHLORIDE 600; 310; 30; 20 MG/100ML; MG/100ML; MG/100ML; MG/100ML
INJECTION, SOLUTION INTRAVENOUS CONTINUOUS
Status: ACTIVE | OUTPATIENT
Start: 2023-07-24 | End: 2023-07-25

## 2023-07-24 RX ORDER — ATORVASTATIN CALCIUM 10 MG/1
10 TABLET, FILM COATED ORAL NIGHTLY
Status: DISCONTINUED | OUTPATIENT
Start: 2023-07-24 | End: 2023-07-25 | Stop reason: HOSPADM

## 2023-07-24 RX ORDER — SODIUM CHLORIDE 0.9 % (FLUSH) 0.9 %
5-40 SYRINGE (ML) INJECTION EVERY 12 HOURS SCHEDULED
Status: DISCONTINUED | OUTPATIENT
Start: 2023-07-24 | End: 2023-07-24 | Stop reason: SDUPTHER

## 2023-07-24 RX ORDER — SODIUM CHLORIDE, SODIUM LACTATE, POTASSIUM CHLORIDE, CALCIUM CHLORIDE 600; 310; 30; 20 MG/100ML; MG/100ML; MG/100ML; MG/100ML
INJECTION, SOLUTION INTRAVENOUS CONTINUOUS
Status: DISCONTINUED | OUTPATIENT
Start: 2023-07-24 | End: 2023-07-24 | Stop reason: SDUPTHER

## 2023-07-24 RX ORDER — ONDANSETRON 4 MG/1
4 TABLET, ORALLY DISINTEGRATING ORAL EVERY 8 HOURS PRN
Status: DISCONTINUED | OUTPATIENT
Start: 2023-07-24 | End: 2023-07-25 | Stop reason: HOSPADM

## 2023-07-24 RX ORDER — FENTANYL CITRATE 50 UG/ML
INJECTION, SOLUTION INTRAMUSCULAR; INTRAVENOUS PRN
Status: DISCONTINUED | OUTPATIENT
Start: 2023-07-24 | End: 2023-07-24 | Stop reason: SDUPTHER

## 2023-07-24 RX ORDER — POLYETHYLENE GLYCOL 3350 17 G/17G
17 POWDER, FOR SOLUTION ORAL DAILY PRN
Status: DISCONTINUED | OUTPATIENT
Start: 2023-07-24 | End: 2023-07-25 | Stop reason: HOSPADM

## 2023-07-24 RX ORDER — FAMOTIDINE 20 MG/1
20 TABLET, FILM COATED ORAL DAILY
Status: DISCONTINUED | OUTPATIENT
Start: 2023-07-24 | End: 2023-07-25 | Stop reason: HOSPADM

## 2023-07-24 RX ORDER — ONDANSETRON 2 MG/ML
4 INJECTION INTRAMUSCULAR; INTRAVENOUS EVERY 6 HOURS PRN
Status: DISCONTINUED | OUTPATIENT
Start: 2023-07-24 | End: 2023-07-24 | Stop reason: SDUPTHER

## 2023-07-24 RX ORDER — GABAPENTIN 100 MG/1
100 CAPSULE ORAL ONCE
Status: COMPLETED | OUTPATIENT
Start: 2023-07-24 | End: 2023-07-24

## 2023-07-24 RX ORDER — MORPHINE SULFATE 2 MG/ML
2 INJECTION, SOLUTION INTRAMUSCULAR; INTRAVENOUS
Status: DISCONTINUED | OUTPATIENT
Start: 2023-07-24 | End: 2023-07-24 | Stop reason: SDUPTHER

## 2023-07-24 RX ORDER — OXYCODONE HCL 10 MG/1
10 TABLET, FILM COATED, EXTENDED RELEASE ORAL ONCE
Status: COMPLETED | OUTPATIENT
Start: 2023-07-24 | End: 2023-07-24

## 2023-07-24 RX ORDER — SODIUM CHLORIDE 9 MG/ML
INJECTION, SOLUTION INTRAVENOUS PRN
Status: DISCONTINUED | OUTPATIENT
Start: 2023-07-24 | End: 2023-07-24

## 2023-07-24 RX ORDER — LEVOTHYROXINE SODIUM 112 UG/1
112 TABLET ORAL DAILY
Status: DISCONTINUED | OUTPATIENT
Start: 2023-07-24 | End: 2023-07-25 | Stop reason: HOSPADM

## 2023-07-24 RX ADMIN — MIDAZOLAM HYDROCHLORIDE 2 MG: 2 INJECTION, SOLUTION INTRAMUSCULAR; INTRAVENOUS at 07:40

## 2023-07-24 RX ADMIN — SODIUM CHLORIDE, POTASSIUM CHLORIDE, SODIUM LACTATE AND CALCIUM CHLORIDE: 600; 310; 30; 20 INJECTION, SOLUTION INTRAVENOUS at 08:51

## 2023-07-24 RX ADMIN — TRANEXAMIC ACID 1000 MG: 10 INJECTION, SOLUTION INTRAVENOUS at 08:12

## 2023-07-24 RX ADMIN — PROPOFOL 80 MCG/KG/MIN: 10 INJECTION, EMULSION INTRAVENOUS at 08:01

## 2023-07-24 RX ADMIN — PHENYLEPHRINE HYDROCHLORIDE 100 MCG: 10 INJECTION INTRAVENOUS at 09:26

## 2023-07-24 RX ADMIN — FENTANYL CITRATE 25 MCG: 50 INJECTION INTRAMUSCULAR; INTRAVENOUS at 09:07

## 2023-07-24 RX ADMIN — CEFAZOLIN 2000 MG: 1 INJECTION, POWDER, FOR SOLUTION INTRAMUSCULAR; INTRAVENOUS at 15:19

## 2023-07-24 RX ADMIN — ONDANSETRON 4 MG: 2 INJECTION INTRAMUSCULAR; INTRAVENOUS at 10:20

## 2023-07-24 RX ADMIN — ATORVASTATIN CALCIUM 10 MG: 10 TABLET, FILM COATED ORAL at 20:35

## 2023-07-24 RX ADMIN — KETOROLAC TROMETHAMINE 15 MG: 15 INJECTION, SOLUTION INTRAMUSCULAR; INTRAVENOUS at 15:19

## 2023-07-24 RX ADMIN — FENTANYL CITRATE 25 MCG: 50 INJECTION INTRAMUSCULAR; INTRAVENOUS at 08:35

## 2023-07-24 RX ADMIN — ACETAMINOPHEN 1000 MG: 500 TABLET, FILM COATED ORAL at 06:48

## 2023-07-24 RX ADMIN — CEFAZOLIN 2000 MG: 1 INJECTION, POWDER, FOR SOLUTION INTRAMUSCULAR; INTRAVENOUS at 08:08

## 2023-07-24 RX ADMIN — SODIUM CHLORIDE, POTASSIUM CHLORIDE, SODIUM LACTATE AND CALCIUM CHLORIDE: 600; 310; 30; 20 INJECTION, SOLUTION INTRAVENOUS at 06:39

## 2023-07-24 RX ADMIN — PROPOFOL 20 MG: 10 INJECTION, EMULSION INTRAVENOUS at 08:03

## 2023-07-24 RX ADMIN — KETOROLAC TROMETHAMINE 15 MG: 15 INJECTION, SOLUTION INTRAMUSCULAR; INTRAVENOUS at 20:38

## 2023-07-24 RX ADMIN — PHENYLEPHRINE HYDROCHLORIDE 200 MCG: 10 INJECTION INTRAVENOUS at 09:18

## 2023-07-24 RX ADMIN — SODIUM CHLORIDE, POTASSIUM CHLORIDE, SODIUM LACTATE AND CALCIUM CHLORIDE: 600; 310; 30; 20 INJECTION, SOLUTION INTRAVENOUS at 18:00

## 2023-07-24 RX ADMIN — SODIUM CHLORIDE, POTASSIUM CHLORIDE, SODIUM LACTATE AND CALCIUM CHLORIDE: 600; 310; 30; 20 INJECTION, SOLUTION INTRAVENOUS at 07:56

## 2023-07-24 RX ADMIN — SODIUM CHLORIDE, POTASSIUM CHLORIDE, SODIUM LACTATE AND CALCIUM CHLORIDE: 600; 310; 30; 20 INJECTION, SOLUTION INTRAVENOUS at 11:02

## 2023-07-24 RX ADMIN — TRANEXAMIC ACID 1000 MG: 10 INJECTION, SOLUTION INTRAVENOUS at 09:09

## 2023-07-24 RX ADMIN — GABAPENTIN 100 MG: 100 CAPSULE ORAL at 06:48

## 2023-07-24 RX ADMIN — ONDANSETRON 4 MG: 2 INJECTION INTRAMUSCULAR; INTRAVENOUS at 10:58

## 2023-07-24 RX ADMIN — PROPOFOL 40 MG: 10 INJECTION, EMULSION INTRAVENOUS at 08:00

## 2023-07-24 RX ADMIN — BUPIVACAINE HYDROCHLORIDE IN DEXTROSE 12 MG: 7.5 INJECTION, SOLUTION SUBARACHNOID at 07:55

## 2023-07-24 RX ADMIN — FENTANYL CITRATE 25 MCG: 50 INJECTION INTRAMUSCULAR; INTRAVENOUS at 08:52

## 2023-07-24 RX ADMIN — ASPIRIN 81 MG: 81 TABLET, COATED ORAL at 20:35

## 2023-07-24 RX ADMIN — FAMOTIDINE 20 MG: 20 TABLET, FILM COATED ORAL at 20:58

## 2023-07-24 RX ADMIN — PHENYLEPHRINE HYDROCHLORIDE 100 MCG: 10 INJECTION INTRAVENOUS at 09:08

## 2023-07-24 RX ADMIN — SENNOSIDES AND DOCUSATE SODIUM 1 TABLET: 8.6; 5 TABLET ORAL at 20:35

## 2023-07-24 RX ADMIN — ROPIVACAINE HYDROCHLORIDE 20 ML: 5 INJECTION, SOLUTION EPIDURAL; INFILTRATION; PERINEURAL at 07:47

## 2023-07-24 RX ADMIN — PHENYLEPHRINE HYDROCHLORIDE 200 MCG: 10 INJECTION INTRAVENOUS at 08:18

## 2023-07-24 RX ADMIN — OXYCODONE HYDROCHLORIDE 10 MG: 10 TABLET, FILM COATED, EXTENDED RELEASE ORAL at 06:48

## 2023-07-24 RX ADMIN — PHENYLEPHRINE HYDROCHLORIDE 200 MCG: 10 INJECTION INTRAVENOUS at 09:36

## 2023-07-24 RX ADMIN — FENTANYL CITRATE 25 MCG: 50 INJECTION INTRAMUSCULAR; INTRAVENOUS at 09:36

## 2023-07-24 RX ADMIN — ACETAMINOPHEN 650 MG: 325 TABLET ORAL at 17:37

## 2023-07-24 RX ADMIN — PROPOFOL 40 MG: 10 INJECTION, EMULSION INTRAVENOUS at 08:23

## 2023-07-24 RX ADMIN — PHENYLEPHRINE HYDROCHLORIDE 200 MCG: 10 INJECTION INTRAVENOUS at 09:00

## 2023-07-24 ASSESSMENT — PAIN SCALES - GENERAL
PAINLEVEL_OUTOF10: 0

## 2023-07-24 ASSESSMENT — PAIN - FUNCTIONAL ASSESSMENT: PAIN_FUNCTIONAL_ASSESSMENT: 0-10

## 2023-07-24 NOTE — OP NOTE
thereby engaging the locking mechanism. The knee continue to extend fully and flex well. There was no varus or valgus laxity throughout the entire arc of motion. The patella tracked nicely within the femoral sulcus. The decision was made proceed with closure. Sponge and instrument counts were correct. Knee was copiously irrigated out with normal saline from the pulse lavage. The arthrotomy was closed with 1 Ethibond suture. The remaining incision was closed in layers. Sterile dressing was applied and patient was stable to the PACU. Postoperative management: Patient is to be weightbearing as tolerated. She will receive 24 hours of appropriate antibiotic coverage. She will receive appropriate DVT prophylaxis.   She will receive appropriate pain medications    Electronically signed by Georgette Arias MD on 7/24/2023 at 9:23 AM

## 2023-07-24 NOTE — ANESTHESIA PROCEDURE NOTES
Spinal Block    Patient location during procedure: pre-op  End time: 7/24/2023 7:55 AM  Reason for block: primary anesthetic  Staffing  Performed: anesthesiologist   Anesthesiologist: Live Contreras MD  Spinal Block  Patient position: sitting  Prep: Betadine  Patient monitoring: cardiac monitor, continuous pulse ox and frequent blood pressure checks  Approach: midline  Location: L4/L5  Provider prep: mask and sterile gloves  Local infiltration: lidocaine  Needle  Needle type: Pencan   Needle gauge: 24 G  Needle length: 3.5 in  Assessment  Sensory level: T6  Events: cerebrospinal fluid  Swirl obtained: Yes (CSF aspirated)  CSF: clear  Attempts: 1  Hemodynamics: stable  Additional Notes  Free flowing CSF. Negative heme. Negative paresthesia.   .  Preanesthetic Checklist  Completed: patient identified, IV checked, site marked, risks and benefits discussed, surgical/procedural consents, equipment checked, pre-op evaluation, timeout performed, anesthesia consent given, oxygen available and monitors applied/VS acknowledged

## 2023-07-24 NOTE — ANESTHESIA PROCEDURE NOTES
Peripheral Block    Patient location during procedure: pre-op  Reason for block: post-op pain management and at surgeon's request  Start time: 7/24/2023 7:42 AM  End time: 7/24/2023 7:47 AM  Staffing  Performed: anesthesiologist   Anesthesiologist: Magalie Schafer MD  Preanesthetic Checklist  Completed: patient identified, IV checked, site marked, risks and benefits discussed, surgical/procedural consents, equipment checked, pre-op evaluation, timeout performed, anesthesia consent given, oxygen available and monitors applied/VS acknowledged  Peripheral Block   Patient position: supine  Prep: ChloraPrep  Provider prep: mask and sterile gloves (Sterile probe cover)  Patient monitoring: cardiac monitor, continuous pulse ox, frequent blood pressure checks and IV access  Block type: Saphenous  Laterality: right  Injection technique: single-shot  Guidance: nerve stimulator and ultrasound guided  Local infiltration: ropivacaine  Infiltration strength: 0.5 %  Local infiltration: ropivacaine  Dose: 20 mL    Needle   Needle type: combined needle/nerve stimulator   Needle gauge: 22 G  Needle localization: anatomical landmarks and ultrasound guidance  Needle length: 10 cm  Assessment   Injection assessment: negative aspiration for heme, no paresthesia on injection and local visualized surrounding nerve on ultrasound  Paresthesia pain: immediately resolved  Slow fractionated injection: yes  Hemodynamics: stable  Real-time US image taken/store: yes  Outcomes: uncomplicated    Additional Notes  Ultrasound image printed and saved in patient chart.     Sterile probe cover used

## 2023-07-24 NOTE — CONSULTS
Consult      Patient:  Jodie Diehl Case  YOB: 1950    MRN: [id number]     Acct: [de-identified]    Primary Care Physician: Zahraa Mccullough DO    HISTORY OF PRESENT ILLNESS:   History obtained from chart review and the patient. The patient is a 72 y.o. female whom I have been requested to see by Dr. Ruy Gaffney for evaluation of HLP/hypothyroidism. Patient has long standing history of OA, came for scheduled RTKA. Was seeing and evaluated in POD x1. Per her report she takes synthroid and statin regularly. Has no cardiac history and was seeing cardiology service in preoperative clearance. Denied fever/dyspnea/CP/SOB. Has postoperative nausea      Past Medical History:        Diagnosis Date    Hyperlipidemia     Hypothyroidism     Nocturia     NHI (obstructive sleep apnea)     Osteoarthritis     Polyp of corpus uteri     Stenosis of cervix     Uterine leiomyoma        Past Surgical History:        Procedure Laterality Date    HYSTEROSCOPY  06/04/2021    w/ polypectomy    WRIST FRACTURE SURGERY  2005    left 2 pins placed       Medications:    No current facility-administered medications on file prior to encounter. Current Outpatient Medications on File Prior to Encounter   Medication Sig Dispense Refill    Misc. Devices (BATH/SHOWER SEAT) MISC Dispense 1 Shower/Bath seat 1 each 0    Misc. Devices (CLASSICS ROLLING WALKER) MISC Dispense 1 rolling walker 1 each 0    Misc.  Devices (RAISED TOILET SEAT) MISC Dispense 1 raised toilet seat 1 each 0    levothyroxine (SYNTHROID) 112 MCG tablet Take 1 tablet by mouth Daily      atorvastatin (LIPITOR) 10 MG tablet Take 1 tablet by mouth daily Pt takes at night      calcium carbonate 600 MG TABS tablet Take 1 tablet by mouth daily      Multiple Vitamins-Minerals (THERAPEUTIC MULTIVITAMIN-MINERALS) tablet Take 1 tablet by mouth daily      Cholecalciferol (VITAMIN D3) 50 MCG (2000 UT) CAPS Take by mouth      b complex vitamins capsule Take 1 capsule by mouth daily w/ vit c

## 2023-07-24 NOTE — ANESTHESIA POSTPROCEDURE EVALUATION
Department of Anesthesiology  Postprocedure Note    Patient: Raad Bar  MRN: [id number]  YOB: 1950  Date of evaluation: 7/24/2023      Procedure Summary     Date: 07/24/23 Room / Location: [address]    Anesthesia Start: 5359 Anesthesia Stop: 0110    Procedure: Right total knee arthroplasty, Vishal Triathlon Total Knee System, Anesthesia Requested: Choice with adductor canal block  (PAT WITH DAN 7/17/23 9:00 AM & LAB 7/17/23 AT 10:00 AM) (Right: Knee) Diagnosis:       Osteoarthritis of right knee      (Osteoarthritis of right knee [M17.11])    Surgeons: Vonda Mai MD Responsible Provider: Rashid Talbot MD    Anesthesia Type: MAC, spinal ASA Status: 3          Anesthesia Type: No value filed.     Nikhil Phase I: Nikhil Score: 10    Nikhil Phase II:        Anesthesia Post Evaluation    Patient location during evaluation: bedside  Patient participation: complete - patient participated  Level of consciousness: awake and sleepy but conscious  Pain score: 0  Airway patency: patent  Nausea & Vomiting: no nausea and no vomiting  Complications: no  Cardiovascular status: blood pressure returned to baseline and hemodynamically stable  Respiratory status: acceptable  Hydration status: euvolemic

## 2023-07-24 NOTE — H&P
The history and physical in the electronic medical record dated 7/17/23 was personally reviewed. There are no interval changes that need to be made. Plan is to proceed with a right total knee as scheduled. The patient is opted to proceed with a knee replacement surgery. I brought the model in, and we sat down and talked about surgery. We talked about the recovery. I stressed the importance of physical therapy and active participation in physical therapy to the patient in order to achieve a satisfactory postoperative outcome. We went over the risks of surgery. Risks include, but are not limited to, infection, neurovascular injury, hematoma formation, wound healing complications, blood clot, persistent postoperative pain, ligament injury, and risks of anesthesia. The patient expressed understanding and wishes to proceed with a total knee replacement as above.

## 2023-07-25 VITALS
OXYGEN SATURATION: 98 % | WEIGHT: 240 LBS | BODY MASS INDEX: 42.52 KG/M2 | SYSTOLIC BLOOD PRESSURE: 108 MMHG | DIASTOLIC BLOOD PRESSURE: 57 MMHG | RESPIRATION RATE: 18 BRPM | TEMPERATURE: 98.8 F | HEIGHT: 63 IN | HEART RATE: 57 BPM

## 2023-07-25 LAB
ANION GAP SERPL CALCULATED.3IONS-SCNC: 7 MEQ/L (ref 9–15)
BUN SERPL-MCNC: 16 MG/DL (ref 8–23)
CALCIUM SERPL-MCNC: 8.9 MG/DL (ref 8.5–9.9)
CHLORIDE SERPL-SCNC: 106 MEQ/L (ref 95–107)
CO2 SERPL-SCNC: 27 MEQ/L (ref 20–31)
CREAT SERPL-MCNC: 0.52 MG/DL (ref 0.5–0.9)
ERYTHROCYTE [DISTWIDTH] IN BLOOD BY AUTOMATED COUNT: 13.1 % (ref 11.7–14.4)
GLUCOSE SERPL-MCNC: 101 MG/DL (ref 70–99)
HCT VFR BLD AUTO: 34.1 % (ref 37–47)
HGB BLD-MCNC: 11.1 G/DL (ref 11.2–15.7)
MCH RBC QN AUTO: 30.1 PG (ref 25.6–32.2)
MCHC RBC AUTO-ENTMCNC: 32.6 % (ref 32.2–35.5)
MCV RBC AUTO: 92.4 FL (ref 79.4–94.8)
PLATELET # BLD AUTO: 170 K/UL (ref 182–369)
POTASSIUM SERPL-SCNC: 4.7 MEQ/L (ref 3.4–4.9)
RBC # BLD AUTO: 3.69 M/UL (ref 3.93–5.22)
SODIUM SERPL-SCNC: 140 MEQ/L (ref 135–144)
WBC # BLD AUTO: 7 K/UL (ref 4–10)

## 2023-07-25 PROCEDURE — 97110 THERAPEUTIC EXERCISES: CPT

## 2023-07-25 PROCEDURE — 97530 THERAPEUTIC ACTIVITIES: CPT

## 2023-07-25 PROCEDURE — 97116 GAIT TRAINING THERAPY: CPT

## 2023-07-25 PROCEDURE — 2580000003 HC RX 258: Performed by: PHYSICIAN ASSISTANT

## 2023-07-25 PROCEDURE — 6370000000 HC RX 637 (ALT 250 FOR IP): Performed by: INTERNAL MEDICINE

## 2023-07-25 PROCEDURE — 97535 SELF CARE MNGMENT TRAINING: CPT

## 2023-07-25 PROCEDURE — 80048 BASIC METABOLIC PNL TOTAL CA: CPT

## 2023-07-25 PROCEDURE — 6360000002 HC RX W HCPCS: Performed by: PHYSICIAN ASSISTANT

## 2023-07-25 PROCEDURE — 85027 COMPLETE CBC AUTOMATED: CPT

## 2023-07-25 PROCEDURE — 6370000000 HC RX 637 (ALT 250 FOR IP): Performed by: PHYSICIAN ASSISTANT

## 2023-07-25 PROCEDURE — 36415 COLL VENOUS BLD VENIPUNCTURE: CPT

## 2023-07-25 RX ORDER — ACETAMINOPHEN 500 MG
1000 TABLET ORAL 3 TIMES DAILY
Qty: 42 TABLET | Refills: 1 | Status: SHIPPED | OUTPATIENT
Start: 2023-07-25

## 2023-07-25 RX ORDER — ASPIRIN 81 MG/1
81 TABLET ORAL 2 TIMES DAILY
Qty: 56 TABLET | Refills: 0 | Status: SHIPPED | OUTPATIENT
Start: 2023-07-25 | End: 2023-08-24

## 2023-07-25 RX ORDER — OXYCODONE HYDROCHLORIDE 5 MG/1
5 TABLET ORAL EVERY 6 HOURS PRN
Qty: 28 TABLET | Refills: 0 | Status: SHIPPED | OUTPATIENT
Start: 2023-07-25 | End: 2023-08-01

## 2023-07-25 RX ORDER — DOCUSATE SODIUM 100 MG/1
100 CAPSULE, LIQUID FILLED ORAL 3 TIMES DAILY PRN
Qty: 21 CAPSULE | Refills: 1 | Status: SHIPPED | OUTPATIENT
Start: 2023-07-25 | End: 2023-08-24

## 2023-07-25 RX ORDER — TIZANIDINE 2 MG/1
2 TABLET ORAL EVERY 8 HOURS PRN
Qty: 21 TABLET | Refills: 0 | Status: SHIPPED | OUTPATIENT
Start: 2023-07-25

## 2023-07-25 RX ADMIN — ACETAMINOPHEN 650 MG: 325 TABLET ORAL at 05:53

## 2023-07-25 RX ADMIN — OXYCODONE HYDROCHLORIDE 5 MG: 5 TABLET ORAL at 06:19

## 2023-07-25 RX ADMIN — KETOROLAC TROMETHAMINE 15 MG: 15 INJECTION, SOLUTION INTRAMUSCULAR; INTRAVENOUS at 02:33

## 2023-07-25 RX ADMIN — SENNOSIDES AND DOCUSATE SODIUM 1 TABLET: 8.6; 5 TABLET ORAL at 08:54

## 2023-07-25 RX ADMIN — ASPIRIN 81 MG: 81 TABLET, COATED ORAL at 08:54

## 2023-07-25 RX ADMIN — OXYCODONE HYDROCHLORIDE 5 MG: 5 TABLET ORAL at 10:26

## 2023-07-25 RX ADMIN — ACETAMINOPHEN 650 MG: 325 TABLET ORAL at 10:26

## 2023-07-25 RX ADMIN — ACETAMINOPHEN 650 MG: 325 TABLET ORAL at 00:17

## 2023-07-25 RX ADMIN — CEFAZOLIN 2000 MG: 1 INJECTION, POWDER, FOR SOLUTION INTRAMUSCULAR; INTRAVENOUS at 00:17

## 2023-07-25 RX ADMIN — LEVOTHYROXINE SODIUM 112 MCG: 0.11 TABLET ORAL at 05:54

## 2023-07-25 RX ADMIN — Medication 10 ML: at 08:54

## 2023-07-25 ASSESSMENT — PAIN DESCRIPTION - ORIENTATION
ORIENTATION: RIGHT
ORIENTATION: RIGHT;INNER

## 2023-07-25 ASSESSMENT — PAIN DESCRIPTION - DESCRIPTORS
DESCRIPTORS: ACHING
DESCRIPTORS: ACHING

## 2023-07-25 ASSESSMENT — PAIN SCALES - GENERAL
PAINLEVEL_OUTOF10: 4
PAINLEVEL_OUTOF10: 0
PAINLEVEL_OUTOF10: 4

## 2023-07-25 ASSESSMENT — PAIN DESCRIPTION - LOCATION
LOCATION: KNEE
LOCATION: LEG;KNEE

## 2023-07-25 NOTE — DISCHARGE INSTR - COC
Continuity of Care Form    Patient Name: Jodie Bar   :  1950  MRN:  [id number]    Admit date:  2023  Discharge date:  ***    Code Status Order: Full Code   Advance Directives:   Advance Care Flowsheet Documentation       Date/Time Healthcare Directive Type of Healthcare Directive Copy in [address] Agent's Name Healthcare Agent's Phone Number    [phone] No, patient does not have an advance directive for healthcare treatment -- -- -- -- --            Admitting Physician:  Claire Araujo MD  PCP: Zahraa Mccullough DO    Discharging Nurse: [facility] Unit/Room#: 0208/0208-01  Discharging Unit Phone Number: ***    Emergency Contact:   Extended Emergency Contact Information  Primary Emergency Contact: [address] Phone: [phone]  Mobile Phone: [phone]  Relation: Spouse  Preferred language: English   needed?  No    Past Surgical History:  Past Surgical History:   Procedure Laterality Date    HYSTEROSCOPY  2021    w/ polypectomy    WRIST FRACTURE SURGERY      left 2 pins placed       Immunization History:   Immunization History   Administered Date(s) Administered    COVID-19, PFIZER Bivalent, DO NOT Dilute, (age 12y+), IM, 30 mcg/0.3 mL 2022    COVID-19, PFIZER GRAY top, DO NOT Dilute, (age 15 y+), IM, 30 mcg/0.3 mL 2022    COVID-19, PFIZER PURPLE top, DILUTE for use, (age 15 y+), 30mcg/0.3mL 10/29/2021    COVID-19, [place] Vaccine, Vaccine Unspecified 2021, 2021    Influenza Virus Vaccine 10/01/2015, 10/01/2015, 10/10/2015, 10/10/2015    Influenza, FLUARIX, FLULAVAL, FLUZONE (age 10 mo+) AND AFLURIA, (age 1 y+), PF, 0.5mL 10/19/2013    Influenza, FLUBLOK, (age 25 y+), PF, 0.5mL 10/17/2019    Influenza, FLUZONE (age 72 y+), High Dose, 0.7mL 10/06/2022    Influenza, High Dose (Fluzone 65 yrs and older) 10/10/2017, 10/18/2018, 10/14/2021    Influenza, Intradermal, Preservative free 10/27/2014, 10/12/2016

## 2023-07-26 ENCOUNTER — TELEPHONE (OUTPATIENT)
Dept: ORTHOPEDIC SURGERY | Age: 73
End: 2023-07-26

## 2023-07-26 DIAGNOSIS — Z96.651 STATUS POST TOTAL RIGHT KNEE REPLACEMENT: Primary | ICD-10-CM

## 2023-08-02 RX ORDER — OXYCODONE HYDROCHLORIDE 5 MG/1
5 TABLET ORAL 2 TIMES DAILY PRN
Qty: 14 TABLET | Refills: 0 | Status: SHIPPED | OUTPATIENT
Start: 2023-08-02 | End: 2023-08-09

## 2023-08-02 NOTE — TELEPHONE ENCOUNTER
Patient called in 8/2/23 asking for a small refill of her oxycodone. Patient stated that she needed a week supply, twice a day, one for the day and one for before she went to bed. Please review and advise. Patient's pharmacy is [facility].

## 2023-08-07 ENCOUNTER — OFFICE VISIT (OUTPATIENT)
Dept: ORTHOPEDIC SURGERY | Age: 73
End: 2023-08-07

## 2023-08-07 ENCOUNTER — HOSPITAL ENCOUNTER (OUTPATIENT)
Dept: ORTHOPEDIC SURGERY | Age: 73
Discharge: HOME | End: 2023-08-09
Payer: MEDICARE

## 2023-08-07 VITALS — HEIGHT: 63 IN | BODY MASS INDEX: 42.52 KG/M2 | WEIGHT: 240 LBS | HEART RATE: 79 BPM | OXYGEN SATURATION: 98 %

## 2023-08-07 DIAGNOSIS — Z96.651 STATUS POST TOTAL RIGHT KNEE REPLACEMENT: Primary | ICD-10-CM

## 2023-08-07 DIAGNOSIS — Z96.651 STATUS POST TOTAL RIGHT KNEE REPLACEMENT: ICD-10-CM

## 2023-08-07 PROCEDURE — 73562 X-RAY EXAM OF KNEE 3: CPT

## 2023-08-07 PROCEDURE — 99024 POSTOP FOLLOW-UP VISIT: CPT | Performed by: PHYSICIAN ASSISTANT

## 2023-08-07 RX ORDER — OXYCODONE HYDROCHLORIDE 5 MG/1
5 TABLET ORAL EVERY 8 HOURS PRN
Qty: 21 TABLET | Refills: 0 | Status: SHIPPED | OUTPATIENT
Start: 2023-08-07 | End: 2023-08-14

## 2023-08-07 RX ORDER — CEPHALEXIN 500 MG/1
500 CAPSULE ORAL 4 TIMES DAILY
Qty: 28 CAPSULE | Refills: 0 | Status: SHIPPED | OUTPATIENT
Start: 2023-08-07 | End: 2023-08-14

## 2023-08-07 NOTE — PROGRESS NOTES
Narrative Referring Provider:   Sanjana Dailey      PCP:   Gayle Shaw, DO    ============================  IMPRESSION/PLAN:  ============================  Dylan Bear Case is s/p right Total knee replacement completed on 2023. IMPRESSION: At normal postoperative stage of recovery and small amount of erythema to the right lower aspect of the incision    PLAN:  Continue current conservative treatment. , Rest, Ice, Compression, Elevation PRN. , Antibiotics for cellulitis. , and initiate outpatient physical therapy  Routine follow-up. Ice compression and elevation  Patient Reassurance: Normal post-operative course discussed with patient. Patient reassured and supported. All questions answered. Follow up weeks no X-Rays Needed    Patient presents today for a a routine 1st post-op visit    Status post op:  right Total knee replacement     BMI: There is no height or weight on file to calculate BMI. Post-operative recovery was complicated by uneventful/none. Patient rates their condition as improving. Does the patient still experience pain? 4/10 pain, aching, pain seems to increase at nighttime    Post Op discharge patient location: in home. Functional Assessment is as follows: is ready to begin outpatient PT. Functional difficulties: None. Pain Medication: Tylenol, oxycodone  Currently Ambulating with: a walker =================================  EXAM: POST OP KNEE  =================================  Right Post-Operative Knee    Ambulates with a limp favoring the right. SKIN: Appropriate postop appearance, small amount of erythema to the lateral aspect of the distal incision. No fluctuance. Little warmth, no discharge or drainage and Incision clean/dry/intact. Range of motion is 5 degrees in extension and   95 degrees of flexion. Extension La degrees    Pain with ROM: Yes with deeper flexion    There is Mild effusion.      Mal-alignment: No    Tender to the palpation of Medial

## 2023-08-14 ENCOUNTER — HOSPITAL ENCOUNTER (OUTPATIENT)
Dept: PHYSICAL THERAPY | Age: 73
Setting detail: THERAPIES SERIES
Discharge: HOME | End: 2023-08-14
Payer: MEDICARE

## 2023-08-14 PROCEDURE — 97530 THERAPEUTIC ACTIVITIES: CPT

## 2023-08-14 PROCEDURE — 97162 PT EVAL MOD COMPLEX 30 MIN: CPT

## 2023-08-14 PROCEDURE — 97110 THERAPEUTIC EXERCISES: CPT

## 2023-08-14 ASSESSMENT — PAIN DESCRIPTION - LOCATION: LOCATION: KNEE

## 2023-08-14 ASSESSMENT — PAIN SCALES - GENERAL: PAINLEVEL_OUTOF10: 4

## 2023-08-14 ASSESSMENT — PAIN DESCRIPTION - PAIN TYPE: TYPE: SURGICAL PAIN;ACUTE PAIN

## 2023-08-14 ASSESSMENT — PAIN DESCRIPTION - DESCRIPTORS: DESCRIPTORS: ACHING;SORE;SHARP

## 2023-08-14 ASSESSMENT — PAIN DESCRIPTION - ORIENTATION: ORIENTATION: ANTERIOR;RIGHT

## 2023-08-14 NOTE — PROGRESS NOTES
Physical Therapy: Initial Evaluation    Patient: Radha Bar (77 y.o. female)   Examination Date:   Plan of Care Certification Period: 2023 to 23      :  1950 ;    Confirmed: Yes MRN: [id number]  CSN: [id number]   Insurance: Payor: Gillian Chairez / Plan: [employer] / Product Type: *No Product type* /   Insurance ID: [id number] - (Medicare Managed) Secondary Insurance (if applicable):    Referring Physician: BROOKE Evans Dr, PA   PCP: John Cortes DO Visits to Date/Visits Approved:     No Show/Cancelled Appts: 0  0     Medical Diagnosis: Status post total right knee replacement [Z96.651] R knee OA post R TKA 23  Treatment Diagnosis: difficulty walking and LE weakness post R TKA     PERTINENT MEDICAL HISTORY           Medical History: Chart Reviewed: Yes   Past Medical History:   Diagnosis Date    Hyperlipidemia     Hypothyroidism     Nocturia     NHI (obstructive sleep apnea)     Osteoarthritis     Polyp of corpus uteri     Stenosis of cervix     Uterine leiomyoma      Surgical History:   Past Surgical History:   Procedure Laterality Date    HYSTEROSCOPY  2021    w/ polypectomy    TOTAL KNEE ARTHROPLASTY Right 2023    Right total knee arthroplasty, [employer] Total Knee System, Anesthesia Requested: Choice with adductor canal block  (PAT WITH DAN 23 9:00 AM & LAB 23 AT 10:00 AM) performed by Jordan Mark MD at [address]      left 2 pins placed       Medications:   Current Outpatient Medications:     oxyCODONE (ROXICODONE) 5 MG immediate release tablet, Take 1 tablet by mouth every 8 hours as needed for Pain for up to 7 days. Intended supply: 7 days.  Take lowest dose possible to manage pain Max Daily Amount: 15 mg, Disp: 21 tablet, Rfl: 0    cephALEXin (KEFLEX) 500 MG capsule, Take 1 capsule by mouth 4 times daily for 7 days, Disp: 28 capsule, Rfl: 0    acetaminophen

## 2023-08-16 ENCOUNTER — HOSPITAL ENCOUNTER (OUTPATIENT)
Dept: PHYSICAL THERAPY | Age: 73
Setting detail: THERAPIES SERIES
Discharge: HOME | End: 2023-08-16
Payer: MEDICARE

## 2023-08-16 PROCEDURE — 97110 THERAPEUTIC EXERCISES: CPT

## 2023-08-16 PROCEDURE — 97140 MANUAL THERAPY 1/> REGIONS: CPT

## 2023-08-16 PROCEDURE — 97116 GAIT TRAINING THERAPY: CPT

## 2023-08-16 ASSESSMENT — PAIN DESCRIPTION - LOCATION: LOCATION: KNEE

## 2023-08-16 ASSESSMENT — PAIN DESCRIPTION - ORIENTATION: ORIENTATION: RIGHT

## 2023-08-16 ASSESSMENT — PAIN DESCRIPTION - DESCRIPTORS: DESCRIPTORS: ACHING;SORE

## 2023-08-16 ASSESSMENT — PAIN DESCRIPTION - PAIN TYPE: TYPE: ACUTE PAIN;SURGICAL PAIN

## 2023-08-18 ENCOUNTER — HOSPITAL ENCOUNTER (OUTPATIENT)
Dept: PHYSICAL THERAPY | Age: 73
Setting detail: THERAPIES SERIES
Discharge: HOME | End: 2023-08-18
Payer: MEDICARE

## 2023-08-18 PROCEDURE — 97140 MANUAL THERAPY 1/> REGIONS: CPT

## 2023-08-18 PROCEDURE — 97110 THERAPEUTIC EXERCISES: CPT

## 2023-08-18 ASSESSMENT — PAIN DESCRIPTION - PAIN TYPE: TYPE: ACUTE PAIN;SURGICAL PAIN

## 2023-08-18 ASSESSMENT — PAIN SCALES - GENERAL: PAINLEVEL_OUTOF10: 3

## 2023-08-18 ASSESSMENT — PAIN DESCRIPTION - DESCRIPTORS: DESCRIPTORS: ACHING

## 2023-08-18 ASSESSMENT — PAIN DESCRIPTION - LOCATION: LOCATION: KNEE

## 2023-08-18 ASSESSMENT — PAIN DESCRIPTION - ORIENTATION: ORIENTATION: RIGHT

## 2023-08-18 NOTE — PROGRESS NOTES
Physical Therapy: Daily Note   Patient: Chelle Olvera Case (77 y.o. female)   Examination Date:   Plan of Care/Certification Expiration Date: 23    No data recorded   :  1950 # of Visits since [facility]:   3   MRN: [id number]  CSN: [id number] Start of Care Date:   2023   Insurance: Payor: [facility] MEDICARE / Plan: Rose Casey / Product Type: *No Product type* /   Insurance ID: [id number] - (Medicare Managed) Secondary Insurance (if applicable):    Referring Physician: BROOKE Calero Dr, PA   PCP: Leslie Kehr, DO Visits to Date/Visits Approved: 3 / 12    No Show/Cancelled Appts: 0 / 0     Medical Diagnosis: No admission diagnoses are documented for this encounter. Treatment Diagnosis: difficulty walking and LE weakness post R TKA        SUBJECTIVE EXAMINATION   Pain Level: Pain Screening  Patient Currently in Pain: Yes  Pain Assessment: 0-10  Pain Level: 3  Best Pain Level: 0  Worst Pain Level: 10  Pain Type: Acute pain, Surgical pain  Pain Location: Knee  Pain Orientation: Right  Pain Descriptors: Aching    Patient Comments: Subjective: Pt. with 3/10 in the right knee. She has been compliant with HEP. HEP Compliance: Good        OBJECTIVE EXAMINATION   Restrictions:  Restrictions/Precautions: Up as Tolerated; Surgical Protocols (FWB RLE per ortho)   Required Braces or Orthoses?: No   Implants present? : Metal implants (RLE TKR)                TREATMENT     Exercises:      Treatment Reasoning    Exercise 1: RLE SLR , quad set first and 3-5 hold with slow lower x 10 reps  Exercise 3: RLE heelsides 5 hold and slow return x 10 reps  Exercise 6: Recumbent bike x 5 min Rocking at first then was able to make full revolution backward and then forward today with multple after  Exercise 7: LAQ B x 20 hold 5 sec slow release  Exercise 8: Seated HS curl B x 20 hold 3 sec GTB    Limitations addressed:  Mobility, Strength, Flexibility, Activity tolerance, Pain modulation,

## 2023-08-21 ENCOUNTER — HOSPITAL ENCOUNTER (OUTPATIENT)
Dept: PHYSICAL THERAPY | Age: 73
Setting detail: THERAPIES SERIES
Discharge: HOME | End: 2023-08-21
Payer: MEDICARE

## 2023-08-21 PROCEDURE — 97110 THERAPEUTIC EXERCISES: CPT

## 2023-08-21 PROCEDURE — 97140 MANUAL THERAPY 1/> REGIONS: CPT

## 2023-08-21 ASSESSMENT — PAIN DESCRIPTION - PAIN TYPE: TYPE: SURGICAL PAIN

## 2023-08-21 ASSESSMENT — PAIN DESCRIPTION - ORIENTATION: ORIENTATION: RIGHT

## 2023-08-21 ASSESSMENT — PAIN DESCRIPTION - DESCRIPTORS: DESCRIPTORS: ACHING

## 2023-08-21 ASSESSMENT — PAIN DESCRIPTION - LOCATION: LOCATION: KNEE

## 2023-08-21 ASSESSMENT — PAIN SCALES - GENERAL: PAINLEVEL_OUTOF10: 5

## 2023-08-21 NOTE — PROGRESS NOTES
Physical Therapy: Daily Note   Patient: Katalina Rice Case (77 y.o. female)   Examination Date:   Plan of Care/Certification Expiration Date: 23    No data recorded   :  1950 # of Visits since [facility]:   4   MRN: [id number]  CSN: [id number] Start of Care Date:   2023   Insurance: Payor: [facility] MEDICARE / Plan: Cliff Julien / Product Type: *No Product type* /   Insurance ID: [id number] - (Medicare Managed) Secondary Insurance (if applicable):    Referring Physician: BROOKE Goldman Dr, PA   PCP: Dania Sorto DO Visits to Date/Visits Approved:     No Show/Cancelled Appts: 0 / 0     Medical Diagnosis: No admission diagnoses are documented for this encounter. Treatment Diagnosis: difficulty walking and LE weakness post R TKA        SUBJECTIVE EXAMINATION   Pain Level: Pain Screening  Patient Currently in Pain: Yes  Pain Assessment: 0-10  Pain Level: 5  Pain Type: Surgical pain  Pain Location: Knee  Pain Orientation: Right  Pain Descriptors: Aching    Patient Comments: Subjective: Pt reports having 5/10 right knee achy pain    HEP Compliance: Good        OBJECTIVE EXAMINATION   Restrictions:  Restrictions/Precautions: Up as Tolerated; Surgical Protocols (FWB RLE per ortho)   Required Braces or Orthoses?: No   Implants present? : Metal implants (RLE TKR)        Right AROM  Right PROM         AROM RLE (degrees)  R Knee Flexion : 0-106 with AAROM in supine after manual (AROM 2-103 degrees)            TREATMENT     Exercises:      Treatment Reasoning    Exercise 3: RLE heelsides 5 hold and slow return x 10 reps; QS x 5 reps with manual therapy  Exercise 6: Recumbent bike x 5 min Rocking at first then was able to make full revolution Seat 6 1/                          Manual Therapy: (CPT 45985) Treatment Reasoning     Joint Mobilization: PA mobs with pt in sitting with distraction alond with PROM to increase right knee flexion;  Also PROM in supine and

## 2023-08-23 ENCOUNTER — HOSPITAL ENCOUNTER (OUTPATIENT)
Dept: PHYSICAL THERAPY | Age: 73
Setting detail: THERAPIES SERIES
Discharge: HOME | End: 2023-08-23
Payer: MEDICARE

## 2023-08-23 ENCOUNTER — OFFICE VISIT (OUTPATIENT)
Dept: ORTHOPEDIC SURGERY | Age: 73
End: 2023-08-23

## 2023-08-23 VITALS — HEIGHT: 63 IN | WEIGHT: 240 LBS | BODY MASS INDEX: 42.52 KG/M2

## 2023-08-23 DIAGNOSIS — Z96.651 STATUS POST TOTAL RIGHT KNEE REPLACEMENT: Primary | ICD-10-CM

## 2023-08-23 PROCEDURE — 99024 POSTOP FOLLOW-UP VISIT: CPT | Performed by: PHYSICIAN ASSISTANT

## 2023-08-23 PROCEDURE — 97110 THERAPEUTIC EXERCISES: CPT

## 2023-08-23 PROCEDURE — 97116 GAIT TRAINING THERAPY: CPT

## 2023-08-23 PROCEDURE — 97140 MANUAL THERAPY 1/> REGIONS: CPT

## 2023-08-23 RX ORDER — OXYCODONE HYDROCHLORIDE 5 MG/1
5 TABLET ORAL EVERY 8 HOURS PRN
Qty: 21 TABLET | Refills: 0 | Status: SHIPPED | OUTPATIENT
Start: 2023-08-23 | End: 2023-08-30

## 2023-08-23 ASSESSMENT — PAIN DESCRIPTION - PAIN TYPE: TYPE: SURGICAL PAIN

## 2023-08-23 ASSESSMENT — PAIN SCALES - GENERAL: PAINLEVEL_OUTOF10: 4

## 2023-08-23 ASSESSMENT — PAIN DESCRIPTION - LOCATION: LOCATION: KNEE

## 2023-08-23 ASSESSMENT — PAIN DESCRIPTION - ORIENTATION: ORIENTATION: RIGHT

## 2023-08-23 ASSESSMENT — PAIN DESCRIPTION - DESCRIPTORS: DESCRIPTORS: SHARP

## 2023-08-23 NOTE — PROGRESS NOTES
Narrative Referring Provider:   Camille Lesch      PCP:   Zahraa Mccullough DO    ============================  IMPRESSION/PLAN:  ============================  Adria Monreal Case is s/p right Total knee replacement completed on 2020. IMPRESSION: At normal postoperative stage of recovery    PLAN:  No new treatment indicated. , Continue current conservative treatment. , Rest, Ice, Compression, Elevation PRN. , and Continue physical therapy. Routine follow-up. Ice compression and elevation  Patient Reassurance: Normal post-operative course discussed with patient. Patient reassured and supported. All questions answered. Follow up 1 months No X-Rays Needed    Patient presents today for a a routine 1st post-op visit    Status post op:  right Total knee replacement     BMI: There is no height or weight on file to calculate BMI. Post-operative recovery was complicated by uneventful/none. Patient rates their condition as improving. Does the patient still experience pain? 2/10 pain, aching    Post Op discharge patient location: in home. Functional Assessment is as follows: Has started outpatient PT  Functional difficulties: None. Pain Medication: Tylenol, oxycodone at bedtime and prior to therapy  Currently Ambulating with: a cane    =================================  EXAM: POST OP KNEE  =================================  Right Post-Operative Knee    Ambulates with a limp favoring the right. SKIN: Appropriate postop appearance, very mild erythema, warmth at the lateral aspect of the incision. No fluctuance, discharge or drainage and Incision clean/dry/intact. Range of motion is 0 degrees in extension and   110 degrees of flexion. Extension La degrees    Pain with ROM: Yes with deeper flexion    There is Mild effusion.      Mal-alignment: No    Tender to the palpation of Medial femoral condyle and Medial joint line    Neurovascular Status: Sensation Intact, Moves foot and ankle up & down,

## 2023-08-23 NOTE — PROGRESS NOTES
Physical Therapy: Daily Note   Patient: David Leyvaless Case (77 y.o. female)   Examination Date:   Plan of Care/Certification Expiration Date: 23    No data recorded   :  1950 # of Visits since [facility]:   5   MRN: [id number]  CSN: [id number] Start of Care Date:   2023   Insurance: Payor: [facility] MEDICARE / Plan: Zaria Coma / Product Type: *No Product type* /   Insurance ID: [id number] - (Medicare Managed) Secondary Insurance (if applicable):    Referring Physician: BROOKE Cardoso Dr, PA   PCP: Rafaela Robertson DO Visits to Date/Visits Approved:     No Show/Cancelled Appts: 0 / 0     Medical Diagnosis: No admission diagnoses are documented for this encounter.     Treatment Diagnosis: difficulty walking and LE weakness post R TKA        SUBJECTIVE EXAMINATION   Pain Level: Pain Screening  Patient Currently in Pain: Yes  Pain Assessment: 0-10  Pain Level: 4  Pain Type: Surgical pain  Pain Location: Knee  Pain Orientation: Right  Pain Descriptors: Sharp    Patient Comments: Subjective: Pt reports having 4/10 right knee sharp pain    HEP Compliance: Good        OBJECTIVE EXAMINATION   Restrictions:  Restrictions/Precautions: Up as Tolerated; Surgical Protocols (FWB RLE per ortho)   Required Braces or Orthoses?: No   Implants present? : Metal implants (RLE TKR)               Right AROM  Right PROM         AROM RLE (degrees)  R Knee Flexion (0-145): 0-107 with AAROM in supine after manual            TREATMENT     Exercises:      Treatment Reasoning    Exercise 1: RLE SLR , quad set first and 3-5 hold with slow lower x 13 reps  Exercise 2: RLE supine hip abd with toes up 3 hold x 10 reps  Exercise 3: RLE heelsides 5 hold and slow return x 10 reps; QS x 5 reps with manual therapy  Exercise 6: Recumbent bike x 5 min Rocking at first then was able to make full revolution in reverse  Seat 6 /                          Manual Therapy: (CPT 71582) Treatment Reasoning

## 2023-08-25 ENCOUNTER — HOSPITAL ENCOUNTER (OUTPATIENT)
Dept: PHYSICAL THERAPY | Age: 73
Setting detail: THERAPIES SERIES
Discharge: HOME | End: 2023-08-25
Payer: MEDICARE

## 2023-08-25 PROCEDURE — 97140 MANUAL THERAPY 1/> REGIONS: CPT

## 2023-08-25 PROCEDURE — 97110 THERAPEUTIC EXERCISES: CPT

## 2023-08-25 ASSESSMENT — PAIN DESCRIPTION - LOCATION: LOCATION: KNEE

## 2023-08-25 ASSESSMENT — PAIN DESCRIPTION - PAIN TYPE: TYPE: SURGICAL PAIN

## 2023-08-25 ASSESSMENT — PAIN SCALES - GENERAL: PAINLEVEL_OUTOF10: 3

## 2023-08-25 ASSESSMENT — PAIN DESCRIPTION - DESCRIPTORS: DESCRIPTORS: SHARP;ACHING

## 2023-08-25 ASSESSMENT — PAIN DESCRIPTION - ORIENTATION: ORIENTATION: RIGHT

## 2023-08-25 NOTE — PROGRESS NOTES
reverse  Exercise 7: LAQ B x 20 hold 5 sec slow release 2#  Exercise 8: Seated HS curl B x 20 hold 3 sec BTB    Limitations addressed: Mobility, Strength, Flexibility, Activity tolerance, Pain modulation, Posture                     Manual Therapy: (CPT 00895) Treatment Reasoning     Joint Mobilization: PA mobs with pt in sitting with distraction alond with PROM to increase right knee flexion; Also PROM in supine for knee flexion. Limitations addressed: Edema, Painful spasm, Tissue extensibility, Joint motion                    Pt Education: Additional Comments: KT tape edema, Baker's cyst, scar R knee       ASSESSMENT     Assessment: Assessment: Pt. is progressing with ROM and strneghening most limited by pain post knee in Baker's Cyst.  KT helping already onned. Pain post 5/10. CP issued to go. Body Structures, Functions, Activity Limitations Requiring Skilled Therapeutic Intervention: Decreased functional mobility , Decreased ROM, Decreased strength, Decreased endurance, Increased pain, Decreased posture    Post-Treatment Pain Level: Activity Tolerance: Patient limited by pain; Patient limited by fatigue    Therapy Prognosis: Good       GOALS   Patient Goals : \"I want to be able to walk better and get ready to have my L knee doen when I can. \"  Short Term Goals Completed by 1-2 weeks Current Status Goal Status   Increase AAROM R knee to 5-100 with sheet in supine AAROM 105 R knee with sheet Met   Pt reporting any decrease in R knee pain with walking , currently 4/10 with medication on board for <200 ft of walking   Not Met   Pt able to complete one lap 440 ft walk in <= 4 inutes with <= 2 rest periods in standing with FWW, WBAT RLE                                                                   Long Term Goals Completed by 4-5 weeks up to 12 visits Current Status Goal Status   increase AROM R knee to >= 2-112 deg for better wlakign and less knee pain  and to be able to assist L LE when L TKA performed.  80

## 2023-08-28 ENCOUNTER — HOSPITAL ENCOUNTER (OUTPATIENT)
Dept: PHYSICAL THERAPY | Age: 73
Setting detail: THERAPIES SERIES
Discharge: HOME | End: 2023-08-28
Payer: MEDICARE

## 2023-08-28 PROCEDURE — 97140 MANUAL THERAPY 1/> REGIONS: CPT

## 2023-08-28 PROCEDURE — 97116 GAIT TRAINING THERAPY: CPT

## 2023-08-28 PROCEDURE — 97110 THERAPEUTIC EXERCISES: CPT

## 2023-08-28 NOTE — PROGRESS NOTES
knee flexion  Other: KT tape max center tension acros B knee posterior baker cysts for less pain with mobility. KT tape 4 strips with 3 fingers surrounding R knee to dec inflammation and inc ROM 0 % tension. AROM R knee : 0-108 deg      Assessment:   Conditions Requiring Skilled Therapeutic Intervention  Body Structures, Functions, Activity Limitations Requiring Skilled Therapeutic Intervention: Decreased functional mobility ; Decreased ROM; Decreased strength;Decreased endurance; Increased pain;Decreased posture  Assessment: Pt and  educated throughout tx on KT tape and AP mobs. Pt achieving 0-108 deg R knee AROM post manual. Donned KT tape to dec swelling in R knee as well as B baker's cysts and educated on icing posterior knee when icing knee for dec swelling and pain. Pt ambulated with 2 pt gait pattern using SPC in LUE with inc in R heel strike and longer step lengths. Pt reports pain level 1/10 post. Pt issued ice to go. Continue with POC. Treatment Diagnosis: difficulty walking and LE weakness post R TKA  Therapy Prognosis: Good      G-Code:       OutComes Score:                                                     Goals:  Short Term Goals  Time Frame for Short Term Goals: 1-2 weeks  Short Term Goal 1: Increase AAROM R knee to 5-100 with sheet in supine  STG 1 Current Status[de-identified] AAROM 105 R knee with sheet  STG Goal 1 Status[de-identified] Met  Short Term Goal 2: Pt reporting any decrease in R knee pain with walking , currently 4/10 with medication on board for <200 ft of walking  STG Goal 2 Status[de-identified] Not Met  Short Term Goal 3: Pt able to complete one lap 440 ft walk in <= 4 inutes with <= 2 rest periods in standing with FWW, WBAT RLE  Long Term Goals  Time Frame for Long Term Goals : 4-5 weeks up to 12 visits  Long Term Goal 1: increase AROM R knee to >= 2-112 deg for better wlakign and less knee pain  and to be able to assist L LE when L TKA performed.   LTG 1 Current Status[de-identified] 98 AROM,  AAROM 105  Long

## 2023-08-30 ENCOUNTER — HOSPITAL ENCOUNTER (OUTPATIENT)
Dept: PHYSICAL THERAPY | Age: 73
Setting detail: THERAPIES SERIES
Discharge: HOME | End: 2023-08-30
Payer: MEDICARE

## 2023-08-30 PROCEDURE — 97116 GAIT TRAINING THERAPY: CPT

## 2023-08-30 PROCEDURE — 97140 MANUAL THERAPY 1/> REGIONS: CPT

## 2023-08-30 PROCEDURE — 97110 THERAPEUTIC EXERCISES: CPT

## 2023-08-30 ASSESSMENT — PAIN DESCRIPTION - ORIENTATION: ORIENTATION: RIGHT

## 2023-08-30 ASSESSMENT — PAIN DESCRIPTION - LOCATION: LOCATION: KNEE

## 2023-08-30 ASSESSMENT — PAIN SCALES - GENERAL: PAINLEVEL_OUTOF10: 2

## 2023-08-30 ASSESSMENT — PAIN DESCRIPTION - DESCRIPTORS: DESCRIPTORS: ACHING

## 2023-08-30 ASSESSMENT — PAIN DESCRIPTION - PAIN TYPE: TYPE: SURGICAL PAIN

## 2023-08-30 NOTE — PROGRESS NOTES
Physical Therapy  Physical Therapy: Daily Note   Patient: Yvette Jordan Case (77 y.o. female)   Examination Date: 10/04/5090  Plan of Care/Certification Expiration Date: 23    No data recorded   :  1950 # of Visits since [facility]:   8   MRN: [id number]  CSN: [id number] Start of Care Date:   2023   Insurance: Payor: [facility] MEDICARE / Plan: Claudine Baker / Product Type: *No Product type* /   Insurance ID: [id number] - (Medicare Managed) Secondary Insurance (if applicable):    Referring Physician: BROOKE Izquierdo Dr, PA   PCP: Mack Moyer DO Visits to Date/Visits Approved:   No Show/Cancelled Appts:   0/    0   Medical Diagnosis: No admission diagnoses are documented for this encounter. Treatment Diagnosis: difficulty walking and LE weakness post R TKA        SUBJECTIVE EXAMINATION   Pain Level: Pain Screening  Patient Currently in Pain: Yes  Pain Level: 2  Pain Type: Surgical pain  Pain Location: Knee  Pain Orientation: Right  Pain Descriptors: Aching    Patient Comments: Subjective: Pt. states KT tape still on not sure if helping or not. Also walking at park working on inc distances.     HEP Compliance: Good        OBJECTIVE EXAMINATION   Restrictions:  Restrictions/Precautions: Up as Tolerated; Surgical Protocols (FWB RLE per ortho)   Required Braces or Orthoses?: No   Implants present? : Metal implants (RLE TKR)            Left AROM  Right AROM        AROM RLE (degrees)  R Knee Flexion (0-145): 0-111 deg in supine AROM post LUNA and manual therapy       TREATMENT     Exercises:      Treatment Reasoning    Exercise 1: RLE SLR , quad set first and 3-5 hold with slow lower x 15 reps  Exercise 2: L sidelying R hip ABD x 10 hold 3 sec VC for form  Exercise 3: RLE heelsides 20 hold and slow return x 10 reps  Exercise 5: supine RLE QS x 10 hold 5 sec  Exercise 6: Recumbent bike x 5 min full revolutions seat 6.5  Exercise 10: step ups forward lead R x 5 4\"  Exercise

## 2023-09-01 ENCOUNTER — HOSPITAL ENCOUNTER (OUTPATIENT)
Dept: PHYSICAL THERAPY | Age: 73
Setting detail: THERAPIES SERIES
Discharge: HOME | End: 2023-09-01
Payer: MEDICARE

## 2023-09-01 PROCEDURE — 97116 GAIT TRAINING THERAPY: CPT

## 2023-09-01 PROCEDURE — 97140 MANUAL THERAPY 1/> REGIONS: CPT

## 2023-09-01 PROCEDURE — 97110 THERAPEUTIC EXERCISES: CPT

## 2023-09-01 ASSESSMENT — PAIN DESCRIPTION - LOCATION: LOCATION: KNEE

## 2023-09-01 ASSESSMENT — PAIN DESCRIPTION - DESCRIPTORS: DESCRIPTORS: ACHING;SORE

## 2023-09-01 ASSESSMENT — PAIN DESCRIPTION - PAIN TYPE: TYPE: SURGICAL PAIN

## 2023-09-01 ASSESSMENT — PAIN DESCRIPTION - ORIENTATION: ORIENTATION: RIGHT

## 2023-09-01 ASSESSMENT — PAIN SCALES - GENERAL: PAINLEVEL_OUTOF10: 4

## 2023-09-01 NOTE — PROGRESS NOTES
Physical Therapy  Physical Therapy: Daily Note   Patient: Ulices Gaytan Case (77 y.o. female)   Examination Date:   Plan of Care/Certification Expiration Date: 23    No data recorded   :  1950 # of Visits since [facility]:   9   MRN: [id number]  CSN: [id number] Start of Care Date:   2023   Insurance: Payor: [facility] MEDICARE / Plan: Leonardo Zavala / Product Type: *No Product type* /   Insurance ID: [id number] - (Medicare Managed) Secondary Insurance (if applicable):    Referring Physician: BROOKE Cervantes Dr, PA   PCP: Trip Shelby,  Visits to Date/Visits Approved:     No Show/Cancelled Appts: 0 / 0     Medical Diagnosis: No admission diagnoses are documented for this encounter. Treatment Diagnosis: difficulty walking and LE weakness post R TKA        SUBJECTIVE EXAMINATION   Pain Level: Pain Screening  Patient Currently in Pain: Yes  Pain Assessment: 0-10  Pain Level: 4  Pain Type: Surgical pain  Pain Location: Knee  Pain Orientation: Right  Pain Descriptors: Aching, Sore    Patient Comments: Subjective: Pt. states alittle stiff in her knee. Pt. reports pain 4/10.     HEP Compliance: Good        OBJECTIVE EXAMINATION   Restrictions:  Restrictions/Precautions: Up as Tolerated; Surgical Protocols (FWB RLE per ortho)   Required Braces or Orthoses?: No   Implants present? : Metal implants (RLE TKR)            Left AROM  Right AROM         0-112 deg AROM in supine R knee        TREATMENT     Exercises:      Treatment Reasoning    Exercise 3: RLE heelsides 20 hold and slow return x 10 reps  Exercise 6: Recumbent bike x 5 min full revolutions seat 5  Exercise 9: mini squats; x 15 hold 3 sec  Exercise 12: standing heel and toe raises x 15 hold 3 sec  Exercise 13: standing alternating march x 15 hold 3 sec                          Gait: (CPT [id number])  Treatment Reasoning    Gait Training 1: Pt. ambulated up and down one flight of stairs 9 7\" steps with B HR and

## 2023-09-05 ENCOUNTER — APPOINTMENT (OUTPATIENT)
Dept: PHYSICAL THERAPY | Age: 73
End: 2023-09-05
Payer: MEDICARE

## 2023-09-06 ENCOUNTER — HOSPITAL ENCOUNTER (OUTPATIENT)
Dept: PHYSICAL THERAPY | Age: 73
Setting detail: THERAPIES SERIES
Discharge: HOME | End: 2023-09-06
Payer: MEDICARE

## 2023-09-06 PROCEDURE — 97140 MANUAL THERAPY 1/> REGIONS: CPT

## 2023-09-06 PROCEDURE — 97110 THERAPEUTIC EXERCISES: CPT

## 2023-09-06 ASSESSMENT — PAIN SCALES - GENERAL: PAINLEVEL_OUTOF10: 10

## 2023-09-06 ASSESSMENT — PAIN DESCRIPTION - PAIN TYPE
TYPE: SURGICAL PAIN
TYPE: SURGICAL PAIN

## 2023-09-06 ASSESSMENT — PAIN DESCRIPTION - DESCRIPTORS
DESCRIPTORS: ACHING;SHARP
DESCRIPTORS: ACHING;SHARP

## 2023-09-06 ASSESSMENT — PAIN DESCRIPTION - LOCATION
LOCATION: KNEE
LOCATION: KNEE

## 2023-09-06 ASSESSMENT — PAIN DESCRIPTION - ORIENTATION
ORIENTATION: RIGHT
ORIENTATION: RIGHT

## 2023-09-06 NOTE — PROGRESS NOTES
Cancelled Visit Note        :  1950 # of Visits since [facility]:    MRN: [id number]  CSN: [id number] Start of Care Date:   2023   Insurance: Payor: [facility] MEDICARE / Plan: Arlne Hansen / Product Type: *No Product type* /   Insurance ID: [id number] - (Medicare Managed) Secondary Insurance (if applicable):    Referring Physician: BROOKE Sanders Dr, PA   PCP: Deidra Knapp DO Visits to Date/Visits Approved: 8 /      No Show/Cancelled Appts: 0      Medical Diagnosis: No admission diagnoses are documented for this encounter. Treatment Diagnosis: difficulty walking and LE weakness post R TKA        SUBJECTIVE EXAMINATION   Pain Level:      Patient Comments: Subjective: Pt cx her appt today due to having to take her Mom for testing.           Electronically signed by Varsha Crockett PT  on 2023 at 8:31 AM
Gait: (CPT [id number])  Treatment Reasoning    Gait Training 1: Pt ambulated in hallway with st cane and fairly equal step length for 440'x1 with a slow but steady gait pattern. Pt Education: Additional Comments: KT tape edema, Baker's cyst, scar R knee       ASSESSMENT     Assessment: Assessment: Pt arrives to PT with reports of 10/10 Right knee pain today and that her knee was more swollen. PT measured her right knee as 23\" and left knee was 21\" (2\" more swolledn than left knee). Pt feels that she was on her feet too long as the store. Pt was able to do full revolutions on the bike after warm up and then PT performed manual therapy with pts AAROM 0-112 in supine. Pt then performed standing ther ex and 4\" steps for lateral and forward steps up and also forward step down with good control on her right LE (leading down with her L LE). Pt also ambualted 440'x 1 with improved step length and endurance just needind a short rest break. Continue to progress per POC and progress with strengthening and endurance. Body Structures, Functions, Activity Limitations Requiring Skilled Therapeutic Intervention: Decreased functional mobility , Decreased ROM, Decreased strength, Decreased endurance, Increased pain, Decreased posture        Activity Tolerance: Patient limited by pain; Patient limited by fatigue    Therapy Prognosis: Good       GOALS   Patient Goals : \"I want to be able to walk better and get ready to have my L knee doen when I can. \"  Short Term Goals Completed by 1-2 weeks Current Status Goal Status   Increase AAROM R knee to 5-100 with sheet in supine AAROM 105 R knee with sheet Met   Pt reporting any decrease in R knee pain with walking , currently 4/10 with medication on board for <200 ft of walking   Not Met   Pt able to complete one lap 440 ft walk in <= 4 inutes with <= 2 rest periods in standing with FWW, WBAT RLE                                                                   Long Term

## 2023-09-08 ENCOUNTER — HOSPITAL ENCOUNTER (OUTPATIENT)
Dept: PHYSICAL THERAPY | Age: 73
Setting detail: THERAPIES SERIES
Discharge: HOME | End: 2023-09-08
Payer: MEDICARE

## 2023-09-08 PROCEDURE — 97110 THERAPEUTIC EXERCISES: CPT

## 2023-09-08 PROCEDURE — 97116 GAIT TRAINING THERAPY: CPT

## 2023-09-08 PROCEDURE — 97140 MANUAL THERAPY 1/> REGIONS: CPT

## 2023-09-08 ASSESSMENT — PAIN SCALES - GENERAL: PAINLEVEL_OUTOF10: 8

## 2023-09-08 NOTE — PROGRESS NOTES
Physical Therapy: Daily Note   Patient: Angel Byrne Case (77 y.o. female)   Examination Date:   Plan of Care/Certification Expiration Date: 23    No data recorded   :  1950 # of Visits since [facility]:      MRN: [id number]  CSN: [id number] Start of Care Date:   2023   Insurance: Payor: [facility] MEDICARE / Plan: Melanie Grigsby / Product Type: *No Product type* /   Insurance ID: [id number] - (Medicare Managed) Secondary Insurance (if applicable):    Referring Physician: BROOKE Barrow Dr, PA   PCP: Jess Francisco DO Visits to Date/Visits Approved: 10 / 12    No Show/Cancelled Appts: 0 / 0     Medical Diagnosis: No admission diagnoses are documented for this encounter. Treatment Diagnosis: difficulty walking and LE weakness post R TKA        SUBJECTIVE EXAMINATION   Pain Level: Pain Screening  Patient Currently in Pain: Yes  Pain Assessment: 0-10  Pain Level: 8    Patient Comments: Subjective: Pt reports that her right knee is more swollen today. Pt reports having 8/10 pain today. HEP Compliance: Good        OBJECTIVE EXAMINATION   Restrictions:  Restrictions/Precautions: Up as Tolerated; Surgical Protocols (FWB RLE per ortho)   Required Braces or Orthoses?: No   Implants present? : Metal implants (RLE TKR)                   Right AROM  Right PROM         AROM RLE (degrees)  R Knee Flexion (0-145): 0-112 deg in supine AAROM post manual therapy            TREATMENT     Exercises:      Treatment Reasoning    Exercise 6: Recumbent bike x 5 min full revolutions seat 5                          Gait: (CPT [id number])  Treatment Reasoning    Gait Training 1: Pt ambulated in hallway with st cane and fairly equal step length for 440'x1 with a slow but steady gait pattern not needing a rest break. Pt reports increased pain from 8/10 to 9/10. Pt ambulated 440'x 1 for 3:02 min.                      Manual Therapy: (CPT 32964) Treatment Reasoning     Joint Mobilization: BROOKE dill

## 2023-09-11 ENCOUNTER — HOSPITAL ENCOUNTER (OUTPATIENT)
Dept: PHYSICAL THERAPY | Age: 73
Setting detail: THERAPIES SERIES
Discharge: HOME | End: 2023-09-11
Payer: MEDICARE

## 2023-09-11 PROCEDURE — 97140 MANUAL THERAPY 1/> REGIONS: CPT

## 2023-09-11 PROCEDURE — G0283 ELEC STIM OTHER THAN WOUND: HCPCS

## 2023-09-11 PROCEDURE — 97110 THERAPEUTIC EXERCISES: CPT

## 2023-09-11 ASSESSMENT — PAIN SCALES - GENERAL: PAINLEVEL_OUTOF10: 4

## 2023-09-11 NOTE — PROGRESS NOTES
Reasoning     Joint Mobilization: PA mobs with pt in sitting with distraction along with PROM to increase right knee flexion                      Modalities  Electric Stimulation, unattended:  (CPT 31776) /   Treatment Reasoning    Patient Position: Seated  E-stim type: Interferential (IFC)  E-stim via: 4 Electrode Pads  E-stim Parameters: IFC/high/static to R knee to dec pain in conjunction with CP at 39 intensity                        Pt Education: Additional Comments: KT tape edema, Baker's cyst, scar R knee       ASSESSMENT     Assessment: Assessment: Pt with inc in tightness this date but was able to achieve 0-112 deg AROM post manual and stretching. Donned estim to R knee with CP post for pain relief taking pain level from 5/10-0/10 post. Pt issued TENS unit handout if interested in home purchase. Continue with POC. Body Structures, Functions, Activity Limitations Requiring Skilled Therapeutic Intervention: Decreased functional mobility , Decreased ROM, Decreased strength, Decreased endurance, Increased pain, Decreased posture    Post-Treatment Pain Level: Activity Tolerance: Patient limited by pain; Patient limited by fatigue    Therapy Prognosis: Good       GOALS   Patient Goals : \"I want to be able to walk better and get ready to have my L knee doen when I can. \"  Short Term Goals Completed by 1-2 weeks Current Status Goal Status   Increase AAROM R knee to 5-100 with sheet in supine AAROM 105 R knee with sheet Met   Pt reporting any decrease in R knee pain with walking , currently 4/10 with medication on board for <200 ft of walking   Not Met   Pt able to complete one lap 440 ft walk in <= 4 inutes with <= 2 rest periods in standing with FWW, WBAT RLE                                                                   Long Term Goals Completed by 4-5 weeks up to 12 visits Current Status Goal Status   increase AROM R knee to >= 2-112 deg for better wlakign and less knee pain  and to be able to

## 2023-09-13 ENCOUNTER — HOSPITAL ENCOUNTER (OUTPATIENT)
Dept: PHYSICAL THERAPY | Age: 73
Setting detail: THERAPIES SERIES
Discharge: HOME | End: 2023-09-13
Payer: MEDICARE

## 2023-09-13 ENCOUNTER — OFFICE VISIT (OUTPATIENT)
Dept: ORTHOPEDIC SURGERY | Age: 73
End: 2023-09-13

## 2023-09-13 DIAGNOSIS — Z96.651 STATUS POST TOTAL RIGHT KNEE REPLACEMENT: Primary | ICD-10-CM

## 2023-09-13 PROCEDURE — 97530 THERAPEUTIC ACTIVITIES: CPT

## 2023-09-13 PROCEDURE — G0283 ELEC STIM OTHER THAN WOUND: HCPCS

## 2023-09-13 PROCEDURE — 99024 POSTOP FOLLOW-UP VISIT: CPT | Performed by: PHYSICIAN ASSISTANT

## 2023-09-13 PROCEDURE — 97140 MANUAL THERAPY 1/> REGIONS: CPT

## 2023-09-13 RX ORDER — AMOXICILLIN 500 MG/1
CAPSULE ORAL
Qty: 6 CAPSULE | Refills: 0 | Status: SHIPPED | OUTPATIENT
Start: 2023-09-13

## 2023-09-13 ASSESSMENT — PAIN DESCRIPTION - PAIN TYPE: TYPE: SURGICAL PAIN

## 2023-09-13 ASSESSMENT — PAIN DESCRIPTION - ORIENTATION: ORIENTATION: RIGHT

## 2023-09-13 ASSESSMENT — PAIN SCALES - GENERAL: PAINLEVEL_OUTOF10: 8

## 2023-09-13 ASSESSMENT — PAIN DESCRIPTION - LOCATION: LOCATION: KNEE

## 2023-09-13 NOTE — PROGRESS NOTES
Narrative Referring Provider:   Elaine Mccabe      PCP:   Sintia Fat, DO    ============================  IMPRESSION/PLAN:  ============================  Sondra Fernandez Case is s/p right Total knee replacement completed on 2023. IMPRESSION: No complaints or limitations and At normal postoperative stage of recovery    PLAN:  No new treatment indicated. , Continue current conservative treatment. , Rest, Ice, Compression, Elevation PRN. , and Continue physical therapy. Routine follow-up. Ice compression and elevation  Patient Reassurance: Normal post-operative course discussed with patient. Patient reassured and supported. All questions answered. Follow up 6-week no X-Rays Needed    Patient presents today for a a routine early post-op visit    Status post op:  right Total knee replacement     BMI: There is no height or weight on file to calculate BMI. Post-operative recovery was complicated by uneventful/none. Patient rates their condition as improving. Does the patient still experience pain? 2/10 pain, aching    Post Op discharge patient location: in home. Functional Assessment is as follows: is ready to begin outpatient PT. Functional difficulties: None. Pain Medication: Tylenol, rare oxycodone  Currently Ambulating with: a cane    =================================  EXAM: POST OP KNEE  =================================  Right Post-Operative Knee    Ambulates with a limp favoring the right. SKIN: Appropriate postop appearance, No evidence of erythema, warmth, discharge or drainage and Incision clean/dry/intact. Range of motion is 0 degrees in extension and   110 degrees of flexion. Extension La degrees    Pain with ROM: Yes with deeper flexion    There is Mild effusion.      Mal-alignment: No    Tender to the palpation of Medial femoral condyle and Medial joint line    Neurovascular Status: Sensation Intact, Moves foot and ankle up & down, 2+ dorsalis pedis and negative homans

## 2023-09-13 NOTE — PROGRESS NOTES
Physical Therapy: Recheck Note   Patient: Garrick Sahu Case (77 y.o. female)   Examination Date:   Plan of Care/Certification Expiration Date: 10/13/23    No data recorded   :  1950 # of Visits since [facility]:   13   MRN: [id number]  CSN: [id number] Start of Care Date:   2023   Insurance: Payor: [facility] MEDICARE / Plan: Chen Jose / Product Type: *No Product type* /   Insurance ID: [id number] - (Medicare Managed) Secondary Insurance (if applicable):    Referring Physician: BROOKE Fatima Dr, PA   PCP: Macario Green DO Visits to Date/Visits Approved: 15 / 21    No Show/Cancelled Appts: 0 / 0     Medical Diagnosis: No admission diagnoses are documented for this encounter.     Treatment Diagnosis: difficulty walking and LE weakness post R TKA        SUBJECTIVE EXAMINATION   Pain Level: Pain Screening  Patient Currently in Pain: Yes  Pain Assessment: 0-10  Pain Level: 8  Pain Type: Surgical pain  Pain Location: Knee  Pain Orientation: Right    Patient Comments: Subjective: Pt reports having 8/10 right knee    HEP Compliance: Good        OBJECTIVE EXAMINATION   Restrictions:  Restrictions/Precautions: Up as Tolerated; Surgical Protocols (FWB RLE per ortho)   Required Braces or Orthoses?: No   Implants present? : Metal implants (RLE TKR)            Ambulation/Gait (if applicable):        Left AROM  Right AROM        AROM RLE (degrees)  R Knee Flexion (0-145): 0-110 deg supine, 112 deg flexion seated  R Knee Extension (0): 0       Left PROM  Right PROM                Left Strength  Right Strength              Strength RLE  R Hip Flexion: 4/5  R Hip Extension: 4/5  R Hip ABduction: 4/5  R Hip ADduction: 4/5  R Hip Internal Rotation: 4-/5  R Hip External Rotation: 4-/5  R Knee Flexion: 4/5  R Knee Extension: 4+/5  R Ankle Dorsiflexion: 4+/5  R Ankle Plantar flexion: 4+/5       TREATMENT     Exercises:      Treatment Reasoning        Recumbant bike x 5 min

## 2023-09-15 ENCOUNTER — HOSPITAL ENCOUNTER (OUTPATIENT)
Dept: PHYSICAL THERAPY | Age: 73
Setting detail: THERAPIES SERIES
Discharge: HOME | End: 2023-09-15
Payer: MEDICARE

## 2023-09-15 PROCEDURE — 97140 MANUAL THERAPY 1/> REGIONS: CPT

## 2023-09-15 PROCEDURE — 97116 GAIT TRAINING THERAPY: CPT

## 2023-09-15 PROCEDURE — 97110 THERAPEUTIC EXERCISES: CPT

## 2023-09-15 ASSESSMENT — PAIN DESCRIPTION - PAIN TYPE: TYPE: SURGICAL PAIN

## 2023-09-15 ASSESSMENT — PAIN DESCRIPTION - LOCATION: LOCATION: KNEE

## 2023-09-15 ASSESSMENT — PAIN DESCRIPTION - DESCRIPTORS: DESCRIPTORS: ACHING

## 2023-09-15 ASSESSMENT — PAIN SCALES - GENERAL: PAINLEVEL_OUTOF10: 4

## 2023-09-15 ASSESSMENT — PAIN DESCRIPTION - ORIENTATION: ORIENTATION: RIGHT

## 2023-09-15 NOTE — PROGRESS NOTES
Physical Therapy: Daily Note   Patient: Yvette Bar (77 y.o. female)   Examination Date: 10/81/8558  Plan of Care/Certification Expiration Date: 10/13/23    No data recorded   :  1950 # of Visits since [facility]:   14   MRN: [id number]  CSN: [id number] Start of Care Date:   2023   Insurance: Payor: [facility] MEDICARE / Plan: Claudine Baker / Product Type: *No Product type* /   Insurance ID: [id number] - (Medicare Managed) Secondary Insurance (if applicable):    Referring Physician: BROOKE Izquierdo Dr, PA   PCP: Mack Moyer DO Visits to Date/Visits Approved:     No Show/Cancelled Appts: 0 / 0     Medical Diagnosis: No admission diagnoses are documented for this encounter. Treatment Diagnosis: difficulty walking and LE weakness post R TKA        SUBJECTIVE EXAMINATION   Pain Level: Pain Screening  Patient Currently in Pain: Yes  Pain Assessment: 0-10  Pain Level: 4  Best Pain Level: 0  Worst Pain Level: 10  Pain Type: Surgical pain  Pain Location: Knee  Pain Orientation: Right  Pain Descriptors: Aching    Patient Comments: Subjective: Pt. reported that her pain is about a 4/10. She has been compliant with rotating her pain meds. HEP Compliance: Good        OBJECTIVE EXAMINATION   Restrictions:  Restrictions/Precautions: Up as Tolerated; Surgical Protocols (FWB RLE per ortho)   Required Braces or Orthoses?: No   Implants present? : Metal implants (RLE TKR)                TREATMENT     Exercises:      Treatment Reasoning    Exercise 3: RLE heelsides 20 hold and slow return x 10 reps seated  Exercise 6: Recumbent bike x 5 min  Exercise 7: LAQ B x 20 hold 5 sec slow release 3#  Exercise 9: mini squats; x 20 hold 3 sec  Exercise 10: 6\" F/L x 20 reps  Exercise 12: standing heel and toe raises x 20 hold 3 sec  Exercise 13: standing alternating march x 20 hold 3 sec  Exercise 14: seated hamstring stretch; H30'' x 3    Limitations addressed:  Mobility, Strength,

## 2023-09-19 ENCOUNTER — HOSPITAL ENCOUNTER (OUTPATIENT)
Dept: PHYSICAL THERAPY | Age: 73
Setting detail: THERAPIES SERIES
Discharge: HOME | End: 2023-09-19
Payer: MEDICARE

## 2023-09-19 PROCEDURE — 97140 MANUAL THERAPY 1/> REGIONS: CPT

## 2023-09-19 PROCEDURE — 97530 THERAPEUTIC ACTIVITIES: CPT

## 2023-09-19 PROCEDURE — 97110 THERAPEUTIC EXERCISES: CPT

## 2023-09-19 ASSESSMENT — PAIN DESCRIPTION - ORIENTATION: ORIENTATION: RIGHT

## 2023-09-19 ASSESSMENT — PAIN DESCRIPTION - PAIN TYPE: TYPE: SURGICAL PAIN

## 2023-09-19 ASSESSMENT — PAIN SCALES - GENERAL: PAINLEVEL_OUTOF10: 3

## 2023-09-19 ASSESSMENT — PAIN DESCRIPTION - DESCRIPTORS: DESCRIPTORS: ACHING

## 2023-09-19 ASSESSMENT — PAIN DESCRIPTION - LOCATION: LOCATION: KNEE

## 2023-09-19 NOTE — PROGRESS NOTES
Physical Therapy  Physical Therapy: Daily Note   Patient: Artis Dolan Case (77 y.o. female)   Examination Date:   Plan of Care/Certification Expiration Date: 10/13/23    No data recorded   :  1950 # of Visits since [facility]:   15   MRN: [id number]  CSN: [id number] Start of Care Date:   2023   Insurance: Payor: [facility] MEDICARE / Plan: Ike Torres / Product Type: *No Product type* /   Insurance ID: [id number] - (Medicare Managed) Secondary Insurance (if applicable):    Referring Physician: BROOKE Shannon Dr, PA   PCP: Kwesi Melara DO Visits to Date/Visits Approved: 15 / 21    No Show/Cancelled Appts: 0 / 0     Medical Diagnosis: No admission diagnoses are documented for this encounter. Treatment Diagnosis: difficulty walking and LE weakness post R TKA        SUBJECTIVE EXAMINATION   Pain Level: Pain Screening  Patient Currently in Pain: Yes  Pain Assessment: 0-10  Pain Level: 3  Pain Type: Surgical pain  Pain Location: Knee  Pain Orientation: Right  Pain Descriptors: Aching    Patient Comments: Subjective: Pt. reports pain level 3/10. Pt. recieved home TENS unit brought with her to PT session.     HEP Compliance: Good        OBJECTIVE EXAMINATION   Restrictions:  Restrictions/Precautions: Up as Tolerated; Surgical Protocols (FWB RLE per ortho)   Required Braces or Orthoses?: No   Implants present? : Metal implants (RLE TKR)            Left AROM  Right AROM        AROM RLE (degrees)  RLE General AROM: 0-115 AROM seated in chair       TREATMENT     Exercises:      Treatment Reasoning    Exercise 3: R heel slides hold 30 sec x 5 seated  Exercise 6: Recumbent bike x 5 min  Exercise 9: mini squats; x 10 hold 3 sec  Exercise 10: 6\" F/L x 20 reps  Exercise 11: step up and over lead R and Left  4\" x 10 with good control when leading down with L LE cues for heel first  Exercise 12: standing heel and toe raises x 20 hold 3 sec  Exercise 13: standing alternating

## 2023-09-22 ENCOUNTER — HOSPITAL ENCOUNTER (OUTPATIENT)
Dept: PHYSICAL THERAPY | Age: 73
Setting detail: THERAPIES SERIES
Discharge: HOME | End: 2023-09-22
Payer: MEDICARE

## 2023-09-22 PROCEDURE — 97140 MANUAL THERAPY 1/> REGIONS: CPT

## 2023-09-22 PROCEDURE — 97110 THERAPEUTIC EXERCISES: CPT

## 2023-09-22 ASSESSMENT — PAIN DESCRIPTION - LOCATION: LOCATION: KNEE

## 2023-09-22 ASSESSMENT — PAIN DESCRIPTION - ORIENTATION: ORIENTATION: RIGHT

## 2023-09-22 ASSESSMENT — PAIN SCALES - GENERAL: PAINLEVEL_OUTOF10: 2

## 2023-09-22 ASSESSMENT — PAIN DESCRIPTION - PAIN TYPE: TYPE: SURGICAL PAIN

## 2023-09-22 NOTE — PROGRESS NOTES
Physical Therapy  Physical Therapy: Daily Note   Patient: Kb Hernandez Case (77 y.o. female)   Examination Date:   Plan of Care/Certification Expiration Date: 10/13/23    No data recorded   :  1950 # of Visits since [facility]:   16   MRN: [id number]  CSN: [id number] Start of Care Date:   2023   Insurance: Payor: [facility] MEDICARE / Plan: Greta Matias / Product Type: *No Product type* /   Insurance ID: [id number] - (Medicare Managed) Secondary Insurance (if applicable):    Referring Physician: BROOKE Lopez Dr, PA   PCP: Na Morrow DO Visits to Date/Visits Approved:     No Show/Cancelled Appts: 0 / 0     Medical Diagnosis: No admission diagnoses are documented for this encounter. Treatment Diagnosis: difficulty walking and LE weakness post R TKA        SUBJECTIVE EXAMINATION   Pain Level: Pain Screening  Patient Currently in Pain: Yes  Pain Assessment: 0-10  Pain Level: 2  Pain Type: Surgical pain  Pain Location: Knee  Pain Orientation: Right    Patient Comments: Subjective: Pt. states she was able to go up and down stairs reciprical with some pain. Pt. reports she was able to do wall slides x 5. HEP Compliance: Good        OBJECTIVE EXAMINATION   Restrictions:  Restrictions/Precautions: Up as Tolerated; Surgical Protocols (FWB RLE per ortho)   Required Braces or Orthoses?: No   Implants present? : Metal implants (RLE TKR)            Left AROM  Right AROM        AROM RLE (degrees)  RLE General AROM: 0-115 AROM seated in chair       TREATMENT     Exercises:      Treatment Reasoning    Exercise 2: Standing B hip ABD x 20 hold 3 sec  Exercise 3: R heel slides hold 30 sec x 5 seated  Exercise 4: Standing B hip ext x 10 hold 3 sec  Exercise 6: Recumbent bike x 5 min   Reviewed further standing exercises issued HO  Limitations addressed:  Mobility, Strength, Flexibility, Activity tolerance, Pain modulation, Posture                     Gait: (CPT [id number])

## 2023-09-26 ENCOUNTER — HOSPITAL ENCOUNTER (OUTPATIENT)
Dept: PHYSICAL THERAPY | Age: 73
Setting detail: THERAPIES SERIES
Discharge: HOME | End: 2023-09-26
Payer: MEDICARE

## 2023-09-26 PROCEDURE — 97140 MANUAL THERAPY 1/> REGIONS: CPT

## 2023-09-26 PROCEDURE — 97110 THERAPEUTIC EXERCISES: CPT

## 2023-09-26 NOTE — PROGRESS NOTES
Physical Therapy  Physical Therapy: Daily Note   Patient: Rafaela Bar (77 y.o. female)   Examination Date:   Plan of Care/Certification Expiration Date: 10/13/23    No data recorded   :  1950 # of Visits since [facility]:   17   MRN: [id number]  CSN: [id number] Start of Care Date:   2023   Insurance: Payor: [facility] MEDICARE / Plan: Lisette Day / Product Type: *No Product type* /   Insurance ID: [id number] - (Medicare Managed) Secondary Insurance (if applicable):    Referring Physician: BROOKE Bailey     PCP: Tania Closs, DO Visits to Date/Visits Approved:     No Show/Cancelled Appts: 0 / 0     Medical Diagnosis: No admission diagnoses are documented for this encounter. Treatment Diagnosis: difficulty walking and LE weakness post R TKA        SUBJECTIVE EXAMINATION   Pain Level: Pain Screening  Patient Currently in Pain: No    Patient Comments: Subjective: Pt. states she has been focusing on leading down the stairs with her R knee and doing squats at home. Pt. denies pain currently major c/o stiffness. HEP Compliance: Good        OBJECTIVE EXAMINATION   Restrictions:  Restrictions/Precautions: Up as Tolerated; Surgical Protocols (FWB RLE per ortho)   Required Braces or Orthoses?: No   Implants present? : Metal implants (RLE TKR)            Left AROM  Right AROM        AROM RLE (degrees)  RLE General AROM: 0-105 deg in supine limited by pain post lateral R knee has Barrios's cyst.       TREATMENT     Exercises:      Treatment Reasoning    Exercise 1: Supine HS stretch R hold 30 sec x 3 VC for form  Exercise 2: Supine IT band stretch 30 sec x 2  Exercise 3: Standing Gastroc stretch R 30 sec x 3  Exercise 4: Supported standing B hip ABD x 10 hold 5 sec  Exercise 5: Supported Standing B hip Ext x 10 hold 5 sec    Limitations addressed:  Mobility, Strength, Flexibility, Activity tolerance, Pain modulation, Posture                     Manual Therapy: (CPT 11096) Treatment

## 2023-09-29 ENCOUNTER — HOSPITAL ENCOUNTER (OUTPATIENT)
Dept: PHYSICAL THERAPY | Age: 73
Setting detail: THERAPIES SERIES
Discharge: HOME | End: 2023-09-29
Payer: MEDICARE

## 2023-09-29 PROCEDURE — 97140 MANUAL THERAPY 1/> REGIONS: CPT

## 2023-09-29 PROCEDURE — 97110 THERAPEUTIC EXERCISES: CPT

## 2023-09-29 ASSESSMENT — PAIN DESCRIPTION - DESCRIPTORS: DESCRIPTORS: ACHING

## 2023-09-29 ASSESSMENT — PAIN SCALES - GENERAL: PAINLEVEL_OUTOF10: 5

## 2023-09-29 ASSESSMENT — PAIN DESCRIPTION - ORIENTATION: ORIENTATION: RIGHT

## 2023-09-29 ASSESSMENT — PAIN DESCRIPTION - PAIN TYPE: TYPE: SURGICAL PAIN

## 2023-09-29 ASSESSMENT — PAIN DESCRIPTION - LOCATION: LOCATION: KNEE

## 2023-09-29 NOTE — PROGRESS NOTES
Physical Therapy: Daily Note   Patient: Ryan Gravely Case (77 y.o. female)   Examination Date:   Plan of Care/Certification Expiration Date: 10/13/23    No data recorded   :  1950 # of Visits since [facility]:   18   MRN: [id number]  CSN: [id number] Start of Care Date:   2023   Insurance: Payor: [facility] MEDICARE / Plan: Mery Coca / Product Type: *No Product type* /   Insurance ID: [id number] - (Medicare Managed) Secondary Insurance (if applicable):    Referring Physician: BROOKE Nicole Dr, PA   PCP: Avril Morrow DO Visits to Date/Visits Approved:     No Show/Cancelled Appts: 0 / 0     Medical Diagnosis: No admission diagnoses are documented for this encounter.     Treatment Diagnosis: difficulty walking and LE weakness post R TKA        SUBJECTIVE EXAMINATION   Pain Level: Pain Screening  Patient Currently in Pain: Yes  Pain Assessment: 0-10  Pain Level: 5  Pain Type: Surgical pain  Pain Location: Knee  Pain Orientation: Right  Pain Descriptors: Aching    Patient Comments: Subjective: Pt reports right anterior knee pain 5/10 achy pain and concerned about her ROM of her right knee    HEP Compliance: Good        OBJECTIVE EXAMINATION   Restrictions:  Restrictions/Precautions: Up as Tolerated; Surgical Protocols (FWB RLE per ortho)   Required Braces or Orthoses?: No   Implants present? : Metal implants (RLE TKR)              Left AROM  Right AROM        AROM RLE (degrees)  RLE General AROM: 0-110 deg with pt sitting edge of bench  (post manual)m  R Knee Extension (0): 0       TREATMENT     Exercises:      Treatment Reasoning    Exercise 3: Standing Gastroc stretch R 30 sec x 3  Exercise 4: Supported standing B hip ABD x 10 hold 5 sec  Exercise 9: mini squats; x 10 hold 3 sec  Exercise 10: 6\" F/L x 10 reps  Exercise 11: step up and over lead R and Left  4\" x 10 with good control when leading down with L LE cues for heel first  Exercise 16: Standing lunges

## 2023-10-03 ENCOUNTER — HOSPITAL ENCOUNTER (OUTPATIENT)
Dept: PHYSICAL THERAPY | Age: 73
Setting detail: THERAPIES SERIES
Discharge: HOME | End: 2023-10-03
Payer: MEDICARE

## 2023-10-03 PROCEDURE — 97116 GAIT TRAINING THERAPY: CPT

## 2023-10-03 PROCEDURE — 97140 MANUAL THERAPY 1/> REGIONS: CPT

## 2023-10-03 PROCEDURE — 97110 THERAPEUTIC EXERCISES: CPT

## 2023-10-03 ASSESSMENT — PAIN SCALES - GENERAL: PAINLEVEL_OUTOF10: 5

## 2023-10-03 NOTE — PROGRESS NOTES
Pt reporting any decrease in R knee pain with walking , currently 4/10 with medication on board for <200 ft of walking Pt recently has been having 8/10 pain at times ever since she when in store for >20 min. Pt has also decrease her regular pain medicine. Pt ambualted for 250'x 1 with st cane Not Met   Pt able to complete one lap 440 ft walk in <= 4 minutes with <= 2 rest periods in standing with FWW, WBAT RLE (From 9/8/23 session) Pt ambulated in hallway with st cane and fairly equal step length for 440'x1 with a slow but steady gait pattern not needing a rest break. Pt reports increased pain from 8/10 to 9/10. Pt ambulated 440'x 1 for 3:02 min. Met                                                               Long Term Goals Completed by 6-8 weeks up to 18 visits Current Status Goal Status   increase AROM R knee to >= 2-112 deg for better walking and less knee pain  and to be able to assist L LE when L TKA performed AROM R Knee 0-115 deg Met   Increase strength B hips and knees to >= 4+/5 for better mobility with less pain in knees Pt progressing with her strength In progress   ambulate >= 700ft with least AD before fatigued, with inc upright posture and knee pain right <= 2/10 post (9/11/23) patient ambulated 700' with SPC pain 2/10 post dec stance time on RLE.   Mild fatigue post; On 9/13/23 pain was 8/10 so this goal not met consistently yet In progress   4 stairs reciprical with pain in R knee <= 2-3/10 and increased eccentric control Not met yet Not Met   Pt competent advanced HEP for hips and knees to reduce pain and increase functioanl mobility Pt progressing with her HEP In progress   Decrease modified LEFS form 87% diability at eval to <30% disability to show dec paion and inc function post R TKA 9/13/23 LEFs25/64=39% In progress                                        TREATMENT PLAN   Plan Frequency: 1-2  Plan weeks: 6-8 weeks up to 21 visits  Current Treatment Recommendations: Strengthening, ROM,

## 2023-10-06 ENCOUNTER — HOSPITAL ENCOUNTER (OUTPATIENT)
Dept: PHYSICAL THERAPY | Age: 73
Setting detail: THERAPIES SERIES
Discharge: HOME | End: 2023-10-06
Payer: MEDICARE

## 2023-10-06 PROCEDURE — 97110 THERAPEUTIC EXERCISES: CPT

## 2023-10-06 PROCEDURE — 97140 MANUAL THERAPY 1/> REGIONS: CPT

## 2023-10-06 ASSESSMENT — PAIN SCALES - GENERAL: PAINLEVEL_OUTOF10: 3

## 2023-10-06 NOTE — PROGRESS NOTES
Physical Therapy: Daily Note   Patient: Ryan Gravely Case (77 y.o. female)   Examination Date:   Plan of Care/Certification Expiration Date: 10/13/23    No data recorded   :  1950 # of Visits since [facility]:   20   MRN: [id number]  CSN: [id number] Start of Care Date:   2023   Insurance: Payor: [facility] MEDICARE / Plan: [address] / Product Type: *No Product type* /   Insurance ID: [id number] - (Medicare Managed) Secondary Insurance (if applicable):    Referring Physician: BROOKE Nicole Dr, PA   PCP: Avril Morrow DO Visits to Date/Visits Approved:     No Show/Cancelled Appts: 0 / 0     Medical Diagnosis: No admission diagnoses are documented for this encounter. Treatment Diagnosis: difficulty walking and LE weakness post R TKA        SUBJECTIVE EXAMINATION   Pain Level: Pain Screening  Patient Currently in Pain: Yes  Pain Assessment: 0-10  Pain Level: 3    Patient Comments: Subjective: Pt 3/10 right knee pain. HEP Compliance: Good        OBJECTIVE EXAMINATION   Restrictions:  Restrictions/Precautions: Up as Tolerated; Surgical Protocols (FWB RLE per ortho)   Required Braces or Orthoses?: No   Implants present? : Metal implants (RLE TKR)        Right AROM  Right PROM         AROM RLE (degrees)  RLE General AROM: 0-111 deg  R Knee Flexion (0-145): 0-111 degrees flexion            TREATMENT     Exercises:      Treatment Reasoning    Exercise 4: Supported standing B hip ABD x 10 hold 5 sec  Exercise 9: mini squats; x 10 hold 3 sec  Exercise 15: *Standing SLR x 15 hold 3 sec  Exercise 16: *Standing hip abd/adduction x 10 BLE  Exercise 17: *Standing hip extension x 10 B LE  Exercise 18: *Standing HS curls x 10 B LE                          Manual Therapy: (CPT 43841) Treatment Reasoning     Soft Tissue Mobilizaton: STM fo massage to decrease soreness of her right knee;  Also did scar tissus mobs to decrease soreness                             ASSESSMENT

## 2023-10-10 ENCOUNTER — HOSPITAL ENCOUNTER (OUTPATIENT)
Dept: PHYSICAL THERAPY | Age: 73
Setting detail: THERAPIES SERIES
Discharge: HOME | End: 2023-10-10
Payer: MEDICARE

## 2023-10-10 PROCEDURE — 97110 THERAPEUTIC EXERCISES: CPT

## 2023-10-10 PROCEDURE — 97140 MANUAL THERAPY 1/> REGIONS: CPT

## 2023-10-10 ASSESSMENT — PAIN SCALES - GENERAL: PAINLEVEL_OUTOF10: 3

## 2023-10-10 NOTE — PROGRESS NOTES
Physical Therapy  Physical Therapy: Daily Note   Patient: Danika Traore Case (77 y.o. female)   Examination Date:   Plan of Care/Certification Expiration Date: 10/13/23    No data recorded   :  1950 # of Visits since [facility]:   21   MRN: [id number]  CSN: [id number] Start of Care Date:   2023   Insurance: Payor: [facility] MEDICARE / Plan: [address] / Product Type: *No Product type* /   Insurance ID: [id number] - (Medicare Managed) Secondary Insurance (if applicable):    Referring Physician: BROOKE Cannon Dr, PA   PCP: Mary Meredith DO Visits to Date/Visits Approved:     No Show/Cancelled Appts: 0 / 0     Medical Diagnosis: No admission diagnoses are documented for this encounter. Treatment Diagnosis: difficulty walking and LE weakness post R TKA        SUBJECTIVE EXAMINATION   Pain Level: Pain Screening  Patient Currently in Pain: Yes  Pain Assessment: 0-10  Pain Level: 3    Patient Comments: Subjective: Pt. states her R knee is still warm. Pt. states she still is taking Tyelonol. HEP Compliance: Good        OBJECTIVE EXAMINATION   Restrictions:  Restrictions/Precautions: Up as Tolerated; Surgical Protocols (FWB RLE per ortho)   Required Braces or Orthoses?: No   Implants present? : Metal implants (RLE TKR)            Left AROM  Right AROM        AROM RLE (degrees)  RLE General AROM: 0-100 deg in supine       TREATMENT     Exercises:      Treatment Reasoning    Exercise 1: Supine HS stretch R hold 30 sec x 3  Exercise 4: Supported standing B hip ABD x 20 hold 5 sec  Exercise 5: Supported Standing B hip Ext x 20 hold 5 sec  Exercise 7: LAQ R x 20 hold 5 sec slow release 3#  Exercise 8: Seated HS curl B x 20 hold 3 sec GTB  Exercise 9: mini squats; x 20 hold 3 sec  Exercise 10: SLR x 10 hold 3 sec  Exercise 12: supine heel slides x 10 hold 5-10 sec  Exercise 13: standing alternating march x 20 hold 3 sec    Limitations addressed:  Mobility, Strength,

## 2023-10-12 ENCOUNTER — HOSPITAL ENCOUNTER (OUTPATIENT)
Dept: PHYSICAL THERAPY | Age: 73
Setting detail: THERAPIES SERIES
Discharge: HOME | End: 2023-10-12
Payer: MEDICARE

## 2023-10-12 PROCEDURE — 97530 THERAPEUTIC ACTIVITIES: CPT

## 2023-10-12 PROCEDURE — 97140 MANUAL THERAPY 1/> REGIONS: CPT

## 2023-10-12 ASSESSMENT — PAIN SCALES - GENERAL: PAINLEVEL_OUTOF10: 2

## 2023-10-12 ASSESSMENT — PAIN DESCRIPTION - LOCATION: LOCATION: KNEE

## 2023-10-12 ASSESSMENT — PAIN DESCRIPTION - PAIN TYPE: TYPE: SURGICAL PAIN

## 2023-10-12 ASSESSMENT — PAIN DESCRIPTION - ORIENTATION: ORIENTATION: RIGHT

## 2023-10-12 NOTE — PROGRESS NOTES
Time  Individual Time In:     11:15am     Individual Time Out:  12:10pm   Minutes:  55 min   Electronically signed by Rekha Arellano PT  on 10/12/2023 at 3:14 PM

## 2023-10-30 DIAGNOSIS — M17.12 PRIMARY OSTEOARTHRITIS OF LEFT KNEE: Primary | ICD-10-CM

## 2023-11-01 ENCOUNTER — HOSPITAL ENCOUNTER (OUTPATIENT)
Dept: GENERAL RADIOLOGY | Age: 73
Discharge: HOME | End: 2023-11-03
Payer: MEDICARE

## 2023-11-01 ENCOUNTER — OFFICE VISIT (OUTPATIENT)
Dept: ORTHOPEDIC SURGERY | Age: 73
End: 2023-11-01
Payer: MEDICARE

## 2023-11-01 VITALS
TEMPERATURE: 97.9 F | BODY MASS INDEX: 42.52 KG/M2 | HEART RATE: 73 BPM | WEIGHT: 240 LBS | OXYGEN SATURATION: 99 % | HEIGHT: 63 IN

## 2023-11-01 DIAGNOSIS — M17.12 PRIMARY OSTEOARTHRITIS OF LEFT KNEE: Primary | ICD-10-CM

## 2023-11-01 DIAGNOSIS — M17.12 PRIMARY OSTEOARTHRITIS OF LEFT KNEE: ICD-10-CM

## 2023-11-01 PROCEDURE — 73564 X-RAY EXAM KNEE 4 OR MORE: CPT

## 2023-11-01 PROCEDURE — 1123F ACP DISCUSS/DSCN MKR DOCD: CPT | Performed by: ORTHOPAEDIC SURGERY

## 2023-11-01 PROCEDURE — 99214 OFFICE O/P EST MOD 30 MIN: CPT | Performed by: ORTHOPAEDIC SURGERY

## 2023-11-01 NOTE — PROGRESS NOTES
Doctor: Leslie Kehr, DO PAT  [x] [facility] Date/Time:                                                            [x] History & Physical [] Physician will Provide [] Attached [] Dictated [] Other  [x] Follow Anesthesia Pre-Op Orders for X-rays, [facility]     [x] SN & PT to evaluate and treat/educate disease management, medications, home safety & equipment needs for total joint patients  [] Other: ____________________________________________________  Consults: Medical/Cardiac Clearance done by  ____________________  PRE-OP ORDERS:   Allergies: Lansoprazole, Meloxicam, and Omeprazole Latex Allergies:             Diabetic:           [] IV ________________________  [x] IV Start with J-loop     Preprinted Orders: Attached [] Yes [] No   ANTIBIOTIC PRE-OP: [x] ANCEF 2 gram IVPB if > 120 kg 3 grams IVPB within 1 hour of incision, if ALLERGIC, use VANCOMYCIN 1 gram IV, 2 hours pre-op  [x] TXA Protocol [] Other:   [x] NPO   [] Betablocker (if needed) _____________________________________   [x] Knee high anti-embolic hose [] Thigh high anti-embolic hose   Other: _____________ chlorhexidine_________________________________________    Physician Signature Required:    Date/Time: 11/1/2023

## 2023-11-13 ENCOUNTER — PREP FOR PROCEDURE (OUTPATIENT)
Dept: ORTHOPEDIC SURGERY | Age: 73
End: 2023-11-13

## 2023-11-13 ENCOUNTER — TELEPHONE (OUTPATIENT)
Dept: ORTHOPEDIC SURGERY | Age: 73
End: 2023-11-13

## 2023-11-13 PROBLEM — M17.12 DEGENERATIVE ARTHRITIS OF LEFT KNEE: Status: ACTIVE | Noted: 2023-11-13

## 2023-11-13 NOTE — TELEPHONE ENCOUNTER
Patient wants to know if she can request  jass vaca for anesthesiologist ? She asked dr Vincenzo Castro and he said to request it? She wants to know if you could call her mobile to confirm if its possible  Thanks !

## 2023-12-01 ENCOUNTER — TELEPHONE (OUTPATIENT)
Dept: ORTHOPEDIC SURGERY | Age: 73
End: 2023-12-01

## 2023-12-01 NOTE — TELEPHONE ENCOUNTER
Surgery needs to be canceled and rescheduled for jan. 22nd 2024! Pt called very upset and confused on why her surgery is still scheduled for 12/8/23 when its being rescheduled per dans note in the encounters.

## 2023-12-06 ENCOUNTER — TELEPHONE (OUTPATIENT)
Dept: ORTHOPEDIC SURGERY | Age: 73
End: 2023-12-06

## 2023-12-06 NOTE — TELEPHONE ENCOUNTER
Surgery Phone: [phone]   [unfilled]   Surgery Fax: [phone]    Phone: [phone]          Fax: [phone]: Surgery Scheduling, PAT & PRE-OP Order Form  Call to advance [place] at [phone] at least 24 hours prior to date of service     Surgery Location: [facility] [place] Surgery: [address], [address] Fabiola Serna MD Surgery Date: 24  Time:   Patient's Name: Gaye Ibarra Case : 1950    #:  [id number]  Gender: female  Home Phone:  [phone] Cell Phone: [phone]  Emergency Contact:  Michael Kaufman   Phone: [phone]  Payor: Eduarda Albert /  /  /    ID No.: [unfilled]      PROVIDER TO COMPLETE:  Diagnosis: There were no encounter diagnoses. Procedure/Consent: Left total knee arthroplasty  CPT CODES: 39737  Case Comments/Implants: [place] Triathlon Total Knee System  Surgery Scheduled as:  Outpatient  Anesthesia Requested: Choice with adductor canal block  Referring Family Doctor: DO DUSTIN Perez  [x] [facility] Date/Time:                                                            [x] History & Physical [] Physician will Provide [] Attached [] Dictated [] Other  [x] Follow Anesthesia Pre-Op Orders for X-rays, [facility]     [x] SN & PT to evaluate and treat/educate disease management, medications, home safety & equipment needs for total joint patients  [] Other: ____________________________________________________  Consults: Medical/Cardiac Clearance done by  ____________________  PRE-OP ORDERS:   Allergies: Lansoprazole, Meloxicam, and Omeprazole Latex Allergies:             Diabetic:           [] IV ________________________  [x] IV Start with J-loop     Preprinted Orders: Attached [] Yes [] No   ANTIBIOTIC PRE-OP: [x] ANCEF 2 gram IVPB if > 120 kg 3 grams IVPB within 1 hour of incision, if ALLERGIC, use VANCOMYCIN 1 gram IV, 2 hours pre-op  [x] TXA Protocol [] Other:   [x] NPO   [] Betablocker (if needed)

## 2023-12-08 ENCOUNTER — PREP FOR PROCEDURE (OUTPATIENT)
Dept: ORTHOPEDIC SURGERY | Age: 73
End: 2023-12-08

## 2023-12-08 PROBLEM — M17.12 OSTEOARTHRITIS OF LEFT KNEE: Status: ACTIVE | Noted: 2023-12-08

## 2023-12-08 NOTE — TELEPHONE ENCOUNTER
Charanjit Santos from Dr. Nato De La Cruz office has attempted to contact patient in regards to her surgery details with no success. VM left on patients listed phone number for patient to contact Dr. Nato De La Cruz office to obtain surgery details. My chart sent with surgery detail information as well as letter mailed out to patient with these same details. LM on patients VM asking patient to contact  [facility] office for surgery details.    !!!!Please ask!!!!  Does patient need any medical clearances? ??  _______________________________________    Surgery: 1/22/2024  [x] [facility]    Provider:   [x] Orelia Mcburney, MD      Surgery -   THEY WILL CALL THE DAY BEFORE AROUND DINNER TIME TO GIVE THE PATIENT THE TIME TO ARRIVE TO SURGERY.     Pre-op: 1/15/2024 @10:30a  W/ [facility]   [x] [facility] [address]    Labs: 1/15/2024 @ 11:30a  [place]: [facility] Door B Check In at [address]: 2/5/2024 @8:15a W/ Carl[facility]  [x] [facility] [address]

## 2023-12-08 NOTE — TELEPHONE ENCOUNTER
Sheri Camacho from Dr. Kenya Smiley office has spoken with patient and patient has stated understanding of all appointments and surgery date.        New clearance forms faxed to Cardiologist Dr. Paz Caal and to PCP Dr. Shahram Woodard

## 2024-01-15 ENCOUNTER — HOSPITAL ENCOUNTER (OUTPATIENT)
Dept: PREADMISSION TESTING | Age: 74
Discharge: HOME | End: 2024-01-19
Payer: MEDICARE

## 2024-01-15 ENCOUNTER — OFFICE VISIT (OUTPATIENT)
Dept: ORTHOPEDIC SURGERY | Age: 74
End: 2024-01-15

## 2024-01-15 VITALS
BODY MASS INDEX: 42.52 KG/M2 | HEART RATE: 71 BPM | DIASTOLIC BLOOD PRESSURE: 74 MMHG | SYSTOLIC BLOOD PRESSURE: 123 MMHG | OXYGEN SATURATION: 99 % | HEIGHT: 63 IN | TEMPERATURE: 97.7 F | WEIGHT: 240 LBS

## 2024-01-15 DIAGNOSIS — N30.90 CYSTITIS: ICD-10-CM

## 2024-01-15 DIAGNOSIS — Z01.818 PREOP EXAMINATION: ICD-10-CM

## 2024-01-15 DIAGNOSIS — M17.12 PRIMARY OSTEOARTHRITIS OF LEFT KNEE: Primary | ICD-10-CM

## 2024-01-15 DIAGNOSIS — R73.9 ELEVATED BLOOD SUGAR LEVEL: ICD-10-CM

## 2024-01-15 LAB
ABO + RH BLD: NORMAL
ANION GAP SERPL CALCULATED.3IONS-SCNC: 11 MEQ/L (ref 9–15)
BACTERIA URNS QL MICRO: NEGATIVE /HPF
BASOPHILS # BLD: 0 K/UL (ref 0–0.2)
BASOPHILS NFR BLD: 0.7 %
BILIRUB UR QL STRIP: NEGATIVE
BLD GP AB SCN SERPL QL: NORMAL
BUN SERPL-MCNC: 14 MG/DL (ref 8–23)
CALCIUM SERPL-MCNC: 9.8 MG/DL (ref 8.5–9.9)
CHLORIDE SERPL-SCNC: 101 MEQ/L (ref 95–107)
CLARITY UR: CLEAR
CO2 SERPL-SCNC: 29 MEQ/L (ref 20–31)
COLOR UR: YELLOW
CREAT SERPL-MCNC: 0.56 MG/DL (ref 0.5–0.9)
EOSINOPHIL # BLD: 0.1 K/UL (ref 0–0.7)
EOSINOPHIL NFR BLD: 1.2 %
EPI CELLS #/AREA URNS AUTO: NORMAL /HPF (ref 0–5)
ERYTHROCYTE [DISTWIDTH] IN BLOOD BY AUTOMATED COUNT: 12.5 % (ref 11.5–14.5)
GLUCOSE SERPL-MCNC: 93 MG/DL (ref 70–99)
GLUCOSE UR STRIP-MCNC: NEGATIVE MG/DL
HBA1C MFR BLD: 5.4 % (ref 4.8–5.9)
HCT VFR BLD AUTO: 42.4 % (ref 37–47)
HGB BLD-MCNC: 13.3 G/DL (ref 12–16)
HGB UR QL STRIP: NEGATIVE
HYALINE CASTS #/AREA URNS AUTO: NORMAL /HPF (ref 0–5)
KETONES UR STRIP-MCNC: NEGATIVE MG/DL
LEUKOCYTE ESTERASE UR QL STRIP: ABNORMAL
LYMPHOCYTES # BLD: 0.8 K/UL (ref 1–4.8)
LYMPHOCYTES NFR BLD: 17.7 %
MCH RBC QN AUTO: 29.2 PG (ref 27–31.3)
MCHC RBC AUTO-ENTMCNC: 31.4 % (ref 33–37)
MCV RBC AUTO: 93.2 FL (ref 79.4–94.8)
MONOCYTES # BLD: 0.4 K/UL (ref 0.2–0.8)
MONOCYTES NFR BLD: 9.7 %
NEUTROPHILS # BLD: 3.1 K/UL (ref 1.4–6.5)
NEUTS SEG NFR BLD: 70.7 %
NITRITE UR QL STRIP: NEGATIVE
PH UR STRIP: 5.5 [PH] (ref 5–9)
PLATELET # BLD AUTO: 241 K/UL (ref 130–400)
POTASSIUM SERPL-SCNC: 4.3 MEQ/L (ref 3.4–4.9)
PROT UR STRIP-MCNC: NEGATIVE MG/DL
RBC # BLD AUTO: 4.55 M/UL (ref 4.2–5.4)
RBC #/AREA URNS AUTO: NORMAL /HPF (ref 0–5)
SODIUM SERPL-SCNC: 141 MEQ/L (ref 135–144)
SP GR UR STRIP: 1.02 (ref 1–1.03)
UROBILINOGEN UR STRIP-ACNC: 0.2 E.U./DL
WBC # BLD AUTO: 4.3 K/UL (ref 4.8–10.8)
WBC #/AREA URNS AUTO: NORMAL /HPF (ref 0–5)

## 2024-01-15 PROCEDURE — 87641 MR-STAPH DNA AMP PROBE: CPT

## 2024-01-15 PROCEDURE — 87086 URINE CULTURE/COLONY COUNT: CPT

## 2024-01-15 PROCEDURE — 86901 BLOOD TYPING SEROLOGIC RH(D): CPT

## 2024-01-15 PROCEDURE — 86850 RBC ANTIBODY SCREEN: CPT

## 2024-01-15 PROCEDURE — 86900 BLOOD TYPING SEROLOGIC ABO: CPT

## 2024-01-15 PROCEDURE — PREOPEXAM PRE-OP EXAM: Performed by: PHYSICIAN ASSISTANT

## 2024-01-15 PROCEDURE — 83036 HEMOGLOBIN GLYCOSYLATED A1C: CPT

## 2024-01-15 PROCEDURE — 85025 COMPLETE CBC W/AUTO DIFF WBC: CPT

## 2024-01-15 PROCEDURE — 81001 URINALYSIS AUTO W/SCOPE: CPT

## 2024-01-15 PROCEDURE — 80048 BASIC METABOLIC PNL TOTAL CA: CPT

## 2024-01-15 RX ORDER — CHLORHEXIDINE GLUCONATE 213 G/1000ML
SOLUTION TOPICAL
Qty: 118 ML | Refills: 0 | Status: SHIPPED | OUTPATIENT
Start: 2024-01-15

## 2024-01-15 RX ORDER — SODIUM CHLORIDE 0.9 % (FLUSH) 0.9 %
5-40 SYRINGE (ML) INJECTION PRN
OUTPATIENT
Start: 2024-01-22

## 2024-01-15 RX ORDER — SODIUM CHLORIDE 9 MG/ML
INJECTION, SOLUTION INTRAVENOUS PRN
OUTPATIENT
Start: 2024-01-22

## 2024-01-15 RX ORDER — SODIUM CHLORIDE 0.9 % (FLUSH) 0.9 %
5-40 SYRINGE (ML) INJECTION EVERY 12 HOURS SCHEDULED
OUTPATIENT
Start: 2024-01-22

## 2024-01-15 RX ORDER — SODIUM CHLORIDE, SODIUM LACTATE, POTASSIUM CHLORIDE, CALCIUM CHLORIDE 600; 310; 30; 20 MG/100ML; MG/100ML; MG/100ML; MG/100ML
INJECTION, SOLUTION INTRAVENOUS CONTINUOUS
OUTPATIENT
Start: 2024-01-22

## 2024-01-15 NOTE — H&P
Subjective:     Patient is to be admitted for left total knee arthroplasty performed by Dr. Pollard at [facility] on January 22. This is a pre operative examination consultation.    Patient is a 73 y.o.  female presented with a history of pain in the left knee. Onset of symptoms was gradual 2 years ago with gradually worsening course since that time. The patient noted no past surgery on the left knee. Prior procedures on the knee include injections. Patient has been treated conservatively with over-the-counter NSAIDs and activity modification. Patient currently rates pain in the knee at 6 out of 10 with activity. There is pain at night.    Patient Active Problem List    Diagnosis Date Noted    Osteoarthritis of left knee 12/08/2023    Degenerative arthritis of left knee 11/13/2023    Status post total knee replacement, right 07/24/2023    S/P total knee arthroplasty, right 07/24/2023    Primary osteoarthritis of right knee 09/15/2021    Primary osteoarthritis of left knee 09/15/2021     Past Medical History:   Diagnosis Date    Hyperlipidemia     Hypothyroidism     Nocturia     NHI (obstructive sleep apnea)     Osteoarthritis     Polyp of corpus uteri     Stenosis of cervix     Uterine leiomyoma       Past Surgical History:   Procedure Laterality Date    HYSTEROSCOPY  06/04/2021    w/ polypectomy    TOTAL KNEE ARTHROPLASTY Right 7/24/2023    Right total knee arthroplasty, [place] Triathlon Total Knee System, Anesthesia Requested: Choice with adductor canal block  (PAT WITH DAN 7/17/23 9:00 AM & LAB 7/17/23 AT 10:00 AM) performed by Barry Pollard MD at [facility] OR    WRIST FRACTURE SURGERY  2005    left 2 pins placed      Not in a hospital admission.  Allergies   Allergen Reactions    Lansoprazole Diarrhea    Meloxicam     Omeprazole Diarrhea      Social History     Tobacco Use    Smoking status: Never    Smokeless tobacco: Never   Substance Use Topics    Alcohol use: Yes     Comment: rare      Family History

## 2024-01-16 LAB
BACTERIA UR CULT: NORMAL
MRSA, DNA, NASAL: NEGATIVE
SPECIMEN DESCRIPTION: NORMAL

## 2024-01-19 ENCOUNTER — ANESTHESIA EVENT (OUTPATIENT)
Dept: OPERATING ROOM | Age: 74
End: 2024-01-19
Payer: MEDICARE

## 2024-01-22 ENCOUNTER — APPOINTMENT (OUTPATIENT)
Dept: GENERAL RADIOLOGY | Age: 74
End: 2024-01-22
Attending: ORTHOPAEDIC SURGERY
Payer: MEDICARE

## 2024-01-22 ENCOUNTER — APPOINTMENT (OUTPATIENT)
Dept: ULTRASOUND IMAGING | Age: 74
End: 2024-01-22
Attending: ORTHOPAEDIC SURGERY
Payer: MEDICARE

## 2024-01-22 ENCOUNTER — ANESTHESIA (OUTPATIENT)
Dept: OPERATING ROOM | Age: 74
End: 2024-01-22
Payer: MEDICARE

## 2024-01-22 ENCOUNTER — HOSPITAL ENCOUNTER (OUTPATIENT)
Age: 74
Setting detail: OBSERVATION
Discharge: HOME HEALTH | End: 2024-01-23
Attending: ORTHOPAEDIC SURGERY | Admitting: ORTHOPAEDIC SURGERY
Payer: MEDICARE

## 2024-01-22 DIAGNOSIS — Z96.652 STATUS POST TOTAL LEFT KNEE REPLACEMENT: Primary | ICD-10-CM

## 2024-01-22 DIAGNOSIS — Z96.652 STATUS POST TOTAL KNEE REPLACEMENT, LEFT: ICD-10-CM

## 2024-01-22 PROCEDURE — 3700000001 HC ADD 15 MINUTES (ANESTHESIA): Performed by: ORTHOPAEDIC SURGERY

## 2024-01-22 PROCEDURE — 6370000000 HC RX 637 (ALT 250 FOR IP): Performed by: NURSE PRACTITIONER

## 2024-01-22 PROCEDURE — 97535 SELF CARE MNGMENT TRAINING: CPT

## 2024-01-22 PROCEDURE — G0378 HOSPITAL OBSERVATION PER HR: HCPCS

## 2024-01-22 PROCEDURE — 6360000002 HC RX W HCPCS: Performed by: PHYSICIAN ASSISTANT

## 2024-01-22 PROCEDURE — 94664 DEMO&/EVAL PT USE INHALER: CPT

## 2024-01-22 PROCEDURE — 97166 OT EVAL MOD COMPLEX 45 MIN: CPT

## 2024-01-22 PROCEDURE — 6370000000 HC RX 637 (ALT 250 FOR IP): Performed by: INTERNAL MEDICINE

## 2024-01-22 PROCEDURE — 2580000003 HC RX 258: Performed by: PHYSICIAN ASSISTANT

## 2024-01-22 PROCEDURE — 76942 ECHO GUIDE FOR BIOPSY: CPT

## 2024-01-22 PROCEDURE — C1776 JOINT DEVICE (IMPLANTABLE): HCPCS | Performed by: ORTHOPAEDIC SURGERY

## 2024-01-22 PROCEDURE — 97110 THERAPEUTIC EXERCISES: CPT

## 2024-01-22 PROCEDURE — 3700000000 HC ANESTHESIA ATTENDED CARE: Performed by: ORTHOPAEDIC SURGERY

## 2024-01-22 PROCEDURE — A4217 STERILE WATER/SALINE, 500 ML: HCPCS | Performed by: ORTHOPAEDIC SURGERY

## 2024-01-22 PROCEDURE — 64447 NJX AA&/STRD FEMORAL NRV IMG: CPT | Performed by: ANESTHESIOLOGY

## 2024-01-22 PROCEDURE — 97162 PT EVAL MOD COMPLEX 30 MIN: CPT

## 2024-01-22 PROCEDURE — 2500000003 HC RX 250 WO HCPCS: Performed by: ANESTHESIOLOGY

## 2024-01-22 PROCEDURE — 2580000003 HC RX 258: Performed by: ORTHOPAEDIC SURGERY

## 2024-01-22 PROCEDURE — 7100000000 HC PACU RECOVERY - FIRST 15 MIN: Performed by: ORTHOPAEDIC SURGERY

## 2024-01-22 PROCEDURE — 7100000001 HC PACU RECOVERY - ADDTL 15 MIN: Performed by: ORTHOPAEDIC SURGERY

## 2024-01-22 PROCEDURE — 3600000014 HC SURGERY LEVEL 4 ADDTL 15MIN: Performed by: ORTHOPAEDIC SURGERY

## 2024-01-22 PROCEDURE — 2580000003 HC RX 258: Performed by: NURSE PRACTITIONER

## 2024-01-22 PROCEDURE — 2500000003 HC RX 250 WO HCPCS: Performed by: NURSE PRACTITIONER

## 2024-01-22 PROCEDURE — 97530 THERAPEUTIC ACTIVITIES: CPT

## 2024-01-22 PROCEDURE — 6360000002 HC RX W HCPCS: Performed by: NURSE PRACTITIONER

## 2024-01-22 PROCEDURE — 6360000002 HC RX W HCPCS: Performed by: ORTHOPAEDIC SURGERY

## 2024-01-22 PROCEDURE — 73560 X-RAY EXAM OF KNEE 1 OR 2: CPT

## 2024-01-22 PROCEDURE — 3600000004 HC SURGERY LEVEL 4 BASE: Performed by: ORTHOPAEDIC SURGERY

## 2024-01-22 PROCEDURE — 6360000002 HC RX W HCPCS: Performed by: ANESTHESIOLOGY

## 2024-01-22 PROCEDURE — 2709999900 HC NON-CHARGEABLE SUPPLY: Performed by: ORTHOPAEDIC SURGERY

## 2024-01-22 DEVICE — PRIMARY TIBIAL BASEPLATE
Type: IMPLANTABLE DEVICE | Site: KNEE | Status: FUNCTIONAL
Brand: TRIATHLON

## 2024-01-22 DEVICE — COMPONENT PART KNEE CAPPED K1 STRYKER: Type: IMPLANTABLE DEVICE | Site: KNEE | Status: FUNCTIONAL

## 2024-01-22 DEVICE — CRUCIATE RETAINING FEMORAL
Type: IMPLANTABLE DEVICE | Site: KNEE | Status: FUNCTIONAL
Brand: TRIATHLON

## 2024-01-22 DEVICE — CEMENT BNE RADIOPAQUE FAST SET ACRYL RESIN HI VISC SIMPLEXHV: Type: IMPLANTABLE DEVICE | Site: KNEE | Status: FUNCTIONAL

## 2024-01-22 DEVICE — ASYMMETRIC PATELLA
Type: IMPLANTABLE DEVICE | Site: KNEE | Status: FUNCTIONAL
Brand: TRIATHLON

## 2024-01-22 DEVICE — TIBIAL BEARING INSERT - CS
Type: IMPLANTABLE DEVICE | Site: KNEE | Status: FUNCTIONAL
Brand: TRIATHLON

## 2024-01-22 RX ORDER — DEXTROSE MONOHYDRATE 100 MG/ML
INJECTION, SOLUTION INTRAVENOUS CONTINUOUS PRN
Status: DISCONTINUED | OUTPATIENT
Start: 2024-01-22 | End: 2024-01-22 | Stop reason: HOSPADM

## 2024-01-22 RX ORDER — IPRATROPIUM BROMIDE AND ALBUTEROL SULFATE 2.5; .5 MG/3ML; MG/3ML
1 SOLUTION RESPIRATORY (INHALATION)
Status: DISCONTINUED | OUTPATIENT
Start: 2024-01-22 | End: 2024-01-22 | Stop reason: HOSPADM

## 2024-01-22 RX ORDER — ACETAMINOPHEN 500 MG
1000 TABLET ORAL ONCE
Status: COMPLETED | OUTPATIENT
Start: 2024-01-22 | End: 2024-01-22

## 2024-01-22 RX ORDER — METOCLOPRAMIDE HYDROCHLORIDE 5 MG/ML
10 INJECTION INTRAMUSCULAR; INTRAVENOUS
Status: DISCONTINUED | OUTPATIENT
Start: 2024-01-22 | End: 2024-01-22 | Stop reason: HOSPADM

## 2024-01-22 RX ORDER — MIDAZOLAM HYDROCHLORIDE 1 MG/ML
INJECTION INTRAMUSCULAR; INTRAVENOUS PRN
Status: DISCONTINUED | OUTPATIENT
Start: 2024-01-22 | End: 2024-01-22 | Stop reason: SDUPTHER

## 2024-01-22 RX ORDER — FENTANYL CITRATE 0.05 MG/ML
25 INJECTION, SOLUTION INTRAMUSCULAR; INTRAVENOUS EVERY 5 MIN PRN
Status: DISCONTINUED | OUTPATIENT
Start: 2024-01-22 | End: 2024-01-22 | Stop reason: HOSPADM

## 2024-01-22 RX ORDER — ATORVASTATIN CALCIUM 10 MG/1
10 TABLET, FILM COATED ORAL DAILY
Status: DISCONTINUED | OUTPATIENT
Start: 2024-01-23 | End: 2024-01-23 | Stop reason: HOSPADM

## 2024-01-22 RX ORDER — ASPIRIN 81 MG/1
81 TABLET ORAL 2 TIMES DAILY
Status: DISCONTINUED | OUTPATIENT
Start: 2024-01-22 | End: 2024-01-23 | Stop reason: HOSPADM

## 2024-01-22 RX ORDER — HYDROXYZINE HYDROCHLORIDE 10 MG/1
10 TABLET, FILM COATED ORAL EVERY 8 HOURS PRN
Status: DISCONTINUED | OUTPATIENT
Start: 2024-01-22 | End: 2024-01-23 | Stop reason: HOSPADM

## 2024-01-22 RX ORDER — SODIUM CHLORIDE 0.9 % (FLUSH) 0.9 %
5-40 SYRINGE (ML) INJECTION PRN
Status: DISCONTINUED | OUTPATIENT
Start: 2024-01-22 | End: 2024-01-22 | Stop reason: HOSPADM

## 2024-01-22 RX ORDER — OXYCODONE HYDROCHLORIDE 5 MG/1
2.5 TABLET ORAL EVERY 4 HOURS PRN
Status: DISCONTINUED | OUTPATIENT
Start: 2024-01-22 | End: 2024-01-23 | Stop reason: HOSPADM

## 2024-01-22 RX ORDER — ROPIVACAINE HYDROCHLORIDE 5 MG/ML
INJECTION, SOLUTION EPIDURAL; INFILTRATION; PERINEURAL
Status: COMPLETED | OUTPATIENT
Start: 2024-01-22 | End: 2024-01-22

## 2024-01-22 RX ORDER — SENNA AND DOCUSATE SODIUM 50; 8.6 MG/1; MG/1
1 TABLET, FILM COATED ORAL 2 TIMES DAILY
Status: DISCONTINUED | OUTPATIENT
Start: 2024-01-22 | End: 2024-01-23 | Stop reason: HOSPADM

## 2024-01-22 RX ORDER — SODIUM CHLORIDE 0.9 % (FLUSH) 0.9 %
5-40 SYRINGE (ML) INJECTION EVERY 12 HOURS SCHEDULED
Status: DISCONTINUED | OUTPATIENT
Start: 2024-01-22 | End: 2024-01-22 | Stop reason: HOSPADM

## 2024-01-22 RX ORDER — SODIUM CHLORIDE 9 MG/ML
INJECTION, SOLUTION INTRAVENOUS PRN
Status: DISCONTINUED | OUTPATIENT
Start: 2024-01-22 | End: 2024-01-22 | Stop reason: HOSPADM

## 2024-01-22 RX ORDER — HYDRALAZINE HYDROCHLORIDE 20 MG/ML
10 INJECTION INTRAMUSCULAR; INTRAVENOUS
Status: DISCONTINUED | OUTPATIENT
Start: 2024-01-22 | End: 2024-01-22 | Stop reason: HOSPADM

## 2024-01-22 RX ORDER — ONDANSETRON 2 MG/ML
4 INJECTION INTRAMUSCULAR; INTRAVENOUS EVERY 6 HOURS PRN
Status: DISCONTINUED | OUTPATIENT
Start: 2024-01-22 | End: 2024-01-23 | Stop reason: HOSPADM

## 2024-01-22 RX ORDER — OXYCODONE HCL 10 MG/1
10 TABLET, FILM COATED, EXTENDED RELEASE ORAL ONCE
Status: COMPLETED | OUTPATIENT
Start: 2024-01-22 | End: 2024-01-22

## 2024-01-22 RX ORDER — ONDANSETRON 4 MG/1
4 TABLET, ORALLY DISINTEGRATING ORAL EVERY 8 HOURS PRN
Status: DISCONTINUED | OUTPATIENT
Start: 2024-01-22 | End: 2024-01-23 | Stop reason: HOSPADM

## 2024-01-22 RX ORDER — CALCIUM CARBONATE 500 MG/1
1 TABLET, CHEWABLE ORAL DAILY
Status: DISCONTINUED | OUTPATIENT
Start: 2024-01-22 | End: 2024-01-22

## 2024-01-22 RX ORDER — LEVOTHYROXINE SODIUM 112 UG/1
112 TABLET ORAL DAILY
Status: DISCONTINUED | OUTPATIENT
Start: 2024-01-22 | End: 2024-01-23 | Stop reason: HOSPADM

## 2024-01-22 RX ORDER — SODIUM CHLORIDE 0.9 % (FLUSH) 0.9 %
5-40 SYRINGE (ML) INJECTION PRN
Status: DISCONTINUED | OUTPATIENT
Start: 2024-01-22 | End: 2024-01-23 | Stop reason: HOSPADM

## 2024-01-22 RX ORDER — CYCLOBENZAPRINE HCL 10 MG
5 TABLET ORAL 3 TIMES DAILY PRN
Status: DISCONTINUED | OUTPATIENT
Start: 2024-01-22 | End: 2024-01-23 | Stop reason: HOSPADM

## 2024-01-22 RX ORDER — SODIUM CHLORIDE, SODIUM LACTATE, POTASSIUM CHLORIDE, CALCIUM CHLORIDE 600; 310; 30; 20 MG/100ML; MG/100ML; MG/100ML; MG/100ML
INJECTION, SOLUTION INTRAVENOUS CONTINUOUS
Status: DISCONTINUED | OUTPATIENT
Start: 2024-01-22 | End: 2024-01-22 | Stop reason: HOSPADM

## 2024-01-22 RX ORDER — BUPIVACAINE HYDROCHLORIDE 5 MG/ML
INJECTION, SOLUTION EPIDURAL; INTRACAUDAL
Status: COMPLETED | OUTPATIENT
Start: 2024-01-22 | End: 2024-01-22

## 2024-01-22 RX ORDER — KETOROLAC TROMETHAMINE 15 MG/ML
7.5 INJECTION, SOLUTION INTRAMUSCULAR; INTRAVENOUS EVERY 6 HOURS
Status: COMPLETED | OUTPATIENT
Start: 2024-01-22 | End: 2024-01-23

## 2024-01-22 RX ORDER — MAGNESIUM HYDROXIDE/ALUMINUM HYDROXICE/SIMETHICONE 120; 1200; 1200 MG/30ML; MG/30ML; MG/30ML
15 SUSPENSION ORAL EVERY 6 HOURS PRN
Status: DISCONTINUED | OUTPATIENT
Start: 2024-01-22 | End: 2024-01-23 | Stop reason: HOSPADM

## 2024-01-22 RX ORDER — ACETAMINOPHEN 325 MG/1
650 TABLET ORAL EVERY 6 HOURS
Status: DISCONTINUED | OUTPATIENT
Start: 2024-01-22 | End: 2024-01-23 | Stop reason: HOSPADM

## 2024-01-22 RX ORDER — OXYCODONE HYDROCHLORIDE 5 MG/1
5 TABLET ORAL EVERY 4 HOURS PRN
Status: DISCONTINUED | OUTPATIENT
Start: 2024-01-22 | End: 2024-01-23 | Stop reason: HOSPADM

## 2024-01-22 RX ORDER — FAMOTIDINE 20 MG/1
20 TABLET, FILM COATED ORAL DAILY
Status: DISCONTINUED | OUTPATIENT
Start: 2024-01-22 | End: 2024-01-23 | Stop reason: HOSPADM

## 2024-01-22 RX ORDER — PROPOFOL 10 MG/ML
INJECTION, EMULSION INTRAVENOUS CONTINUOUS PRN
Status: DISCONTINUED | OUTPATIENT
Start: 2024-01-22 | End: 2024-01-22 | Stop reason: SDUPTHER

## 2024-01-22 RX ORDER — LABETALOL HYDROCHLORIDE 5 MG/ML
10 INJECTION, SOLUTION INTRAVENOUS
Status: DISCONTINUED | OUTPATIENT
Start: 2024-01-22 | End: 2024-01-22 | Stop reason: HOSPADM

## 2024-01-22 RX ORDER — CELECOXIB 100 MG/1
200 CAPSULE ORAL ONCE
Status: COMPLETED | OUTPATIENT
Start: 2024-01-22 | End: 2024-01-22

## 2024-01-22 RX ORDER — SODIUM CHLORIDE 9 MG/ML
INJECTION, SOLUTION INTRAVENOUS CONTINUOUS
Status: DISCONTINUED | OUTPATIENT
Start: 2024-01-22 | End: 2024-01-23

## 2024-01-22 RX ORDER — EPHEDRINE SULFATE 50 MG/ML
INJECTION INTRAVENOUS PRN
Status: DISCONTINUED | OUTPATIENT
Start: 2024-01-22 | End: 2024-01-22 | Stop reason: SDUPTHER

## 2024-01-22 RX ORDER — ONDANSETRON 2 MG/ML
4 INJECTION INTRAMUSCULAR; INTRAVENOUS
Status: DISCONTINUED | OUTPATIENT
Start: 2024-01-22 | End: 2024-01-22 | Stop reason: HOSPADM

## 2024-01-22 RX ORDER — MAGNESIUM HYDROXIDE 1200 MG/15ML
LIQUID ORAL CONTINUOUS PRN
Status: COMPLETED | OUTPATIENT
Start: 2024-01-22 | End: 2024-01-22

## 2024-01-22 RX ORDER — GLUCAGON 1 MG/ML
1 KIT INJECTION PRN
Status: DISCONTINUED | OUTPATIENT
Start: 2024-01-22 | End: 2024-01-22 | Stop reason: HOSPADM

## 2024-01-22 RX ORDER — CALCIUM CARBONATE 500 MG/1
1 TABLET, CHEWABLE ORAL DAILY PRN
Status: DISCONTINUED | OUTPATIENT
Start: 2024-01-22 | End: 2024-01-23 | Stop reason: HOSPADM

## 2024-01-22 RX ORDER — MORPHINE SULFATE 2 MG/ML
2 INJECTION, SOLUTION INTRAMUSCULAR; INTRAVENOUS
Status: DISCONTINUED | OUTPATIENT
Start: 2024-01-22 | End: 2024-01-23 | Stop reason: HOSPADM

## 2024-01-22 RX ORDER — SODIUM CHLORIDE 9 MG/ML
INJECTION, SOLUTION INTRAVENOUS PRN
Status: DISCONTINUED | OUTPATIENT
Start: 2024-01-22 | End: 2024-01-23 | Stop reason: HOSPADM

## 2024-01-22 RX ORDER — IBUPROFEN 200 MG
1 CAPSULE ORAL DAILY
COMMUNITY

## 2024-01-22 RX ORDER — SODIUM CHLORIDE 0.9 % (FLUSH) 0.9 %
5-40 SYRINGE (ML) INJECTION EVERY 12 HOURS SCHEDULED
Status: DISCONTINUED | OUTPATIENT
Start: 2024-01-22 | End: 2024-01-23 | Stop reason: HOSPADM

## 2024-01-22 RX ADMIN — CEFAZOLIN 2000 MG: 2 INJECTION, POWDER, FOR SOLUTION INTRAMUSCULAR; INTRAVENOUS at 09:33

## 2024-01-22 RX ADMIN — EPHEDRINE SULFATE 10 MG: 50 INJECTION INTRAVENOUS at 09:47

## 2024-01-22 RX ADMIN — PROPOFOL 100 MCG/KG/MIN: 10 INJECTION, EMULSION INTRAVENOUS at 09:29

## 2024-01-22 RX ADMIN — ACETAMINOPHEN 1000 MG: 500 TABLET ORAL at 08:20

## 2024-01-22 RX ADMIN — ONDANSETRON 4 MG: 4 TABLET, ORALLY DISINTEGRATING ORAL at 18:23

## 2024-01-22 RX ADMIN — ACETAMINOPHEN 650 MG: 325 TABLET ORAL at 12:45

## 2024-01-22 RX ADMIN — ASPIRIN 81 MG: 81 TABLET, COATED ORAL at 20:42

## 2024-01-22 RX ADMIN — BUPIVACAINE HYDROCHLORIDE 12 MG: 5 INJECTION, SOLUTION EPIDURAL; INTRACAUDAL; PERINEURAL at 09:10

## 2024-01-22 RX ADMIN — EPHEDRINE SULFATE 10 MG: 50 INJECTION INTRAVENOUS at 10:17

## 2024-01-22 RX ADMIN — ONDANSETRON 4 MG: 2 INJECTION INTRAMUSCULAR; INTRAVENOUS at 12:58

## 2024-01-22 RX ADMIN — KETOROLAC TROMETHAMINE 7.5 MG: 15 INJECTION, SOLUTION INTRAMUSCULAR; INTRAVENOUS at 12:45

## 2024-01-22 RX ADMIN — EPHEDRINE SULFATE 10 MG: 50 INJECTION INTRAVENOUS at 10:00

## 2024-01-22 RX ADMIN — SENNOSIDES AND DOCUSATE SODIUM 1 TABLET: 8.6; 5 TABLET ORAL at 20:42

## 2024-01-22 RX ADMIN — SENNOSIDES AND DOCUSATE SODIUM 1 TABLET: 8.6; 5 TABLET ORAL at 12:45

## 2024-01-22 RX ADMIN — ASPIRIN 81 MG: 81 TABLET, COATED ORAL at 12:45

## 2024-01-22 RX ADMIN — SODIUM CHLORIDE, POTASSIUM CHLORIDE, SODIUM LACTATE AND CALCIUM CHLORIDE: 600; 310; 30; 20 INJECTION, SOLUTION INTRAVENOUS at 08:32

## 2024-01-22 RX ADMIN — KETOROLAC TROMETHAMINE 7.5 MG: 15 INJECTION, SOLUTION INTRAMUSCULAR; INTRAVENOUS at 17:00

## 2024-01-22 RX ADMIN — TRANEXAMIC ACID 1000 MG: 10 INJECTION, SOLUTION INTRAVENOUS at 09:37

## 2024-01-22 RX ADMIN — ACETAMINOPHEN 650 MG: 325 TABLET ORAL at 23:51

## 2024-01-22 RX ADMIN — CELECOXIB 200 MG: 100 CAPSULE ORAL at 08:20

## 2024-01-22 RX ADMIN — KETOROLAC TROMETHAMINE 7.5 MG: 15 INJECTION, SOLUTION INTRAMUSCULAR; INTRAVENOUS at 23:52

## 2024-01-22 RX ADMIN — Medication 0.1 MG: at 11:05

## 2024-01-22 RX ADMIN — EPHEDRINE SULFATE 10 MG: 50 INJECTION INTRAVENOUS at 10:39

## 2024-01-22 RX ADMIN — MIDAZOLAM HYDROCHLORIDE 2 MG: 2 INJECTION, SOLUTION INTRAMUSCULAR; INTRAVENOUS at 08:50

## 2024-01-22 RX ADMIN — OXYCODONE HYDROCHLORIDE 5 MG: 5 TABLET ORAL at 18:29

## 2024-01-22 RX ADMIN — ROPIVACAINE HYDROCHLORIDE 20 ML: 5 INJECTION, SOLUTION EPIDURAL; INFILTRATION; PERINEURAL at 08:46

## 2024-01-22 RX ADMIN — ANTACID TABLETS 500 MG: 500 TABLET, CHEWABLE ORAL at 23:51

## 2024-01-22 RX ADMIN — CEFAZOLIN 2000 MG: 2 INJECTION, POWDER, FOR SOLUTION INTRAMUSCULAR; INTRAVENOUS at 17:03

## 2024-01-22 RX ADMIN — OXYCODONE HYDROCHLORIDE 10 MG: 10 TABLET, FILM COATED, EXTENDED RELEASE ORAL at 08:20

## 2024-01-22 RX ADMIN — ACETAMINOPHEN 650 MG: 325 TABLET ORAL at 17:00

## 2024-01-22 RX ADMIN — Medication 0.1 MG: at 10:23

## 2024-01-22 RX ADMIN — TRANEXAMIC ACID 1000 MG: 10 INJECTION, SOLUTION INTRAVENOUS at 10:44

## 2024-01-22 RX ADMIN — SODIUM CHLORIDE: 9 INJECTION, SOLUTION INTRAVENOUS at 23:39

## 2024-01-22 RX ADMIN — OXYCODONE HYDROCHLORIDE 5 MG: 5 TABLET ORAL at 14:33

## 2024-01-22 RX ADMIN — EPHEDRINE SULFATE 10 MG: 50 INJECTION INTRAVENOUS at 10:57

## 2024-01-22 RX ADMIN — SODIUM CHLORIDE: 9 INJECTION, SOLUTION INTRAVENOUS at 12:47

## 2024-01-22 ASSESSMENT — PAIN - FUNCTIONAL ASSESSMENT
PAIN_FUNCTIONAL_ASSESSMENT: NONE - DENIES PAIN
PAIN_FUNCTIONAL_ASSESSMENT: 0-10

## 2024-01-22 ASSESSMENT — PAIN DESCRIPTION - LOCATION: LOCATION: KNEE

## 2024-01-22 ASSESSMENT — PAIN SCALES - GENERAL
PAINLEVEL_OUTOF10: 1
PAINLEVEL_OUTOF10: 7
PAINLEVEL_OUTOF10: 5
PAINLEVEL_OUTOF10: 0

## 2024-01-22 ASSESSMENT — PAIN DESCRIPTION - ORIENTATION: ORIENTATION: LEFT

## 2024-01-22 ASSESSMENT — PAIN DESCRIPTION - DESCRIPTORS: DESCRIPTORS: ACHING

## 2024-01-22 NOTE — ANESTHESIA POSTPROCEDURE EVALUATION
Department of Anesthesiology  Postprocedure Note    Patient: Meme Bar  MRN: [id number]  YOB: 1950  Date of evaluation: 1/22/2024    Procedure Summary       Date: 01/22/24 Room / Location: [address]    Anesthesia Start: 0918 Anesthesia Stop: 1125    Procedure: Left total knee arthroplasty,[place] Triathlon Total Knee System,Choice with adductor canal block (Left: Knee) Diagnosis:       Osteoarthritis of left knee      (Osteoarthritis of left knee [M17.12])    Surgeons: Barry Pollard MD Responsible Provider: Wayne Haro MD    Anesthesia Type: Regional, MAC, Spinal ASA Status: 3            Anesthesia Type: Regional, MAC, Spinal    Nikhil Phase I: Nikhil Score: 10    Nikhil Phase II:      Anesthesia Post Evaluation    Patient location during evaluation: bedside  Patient participation: complete - patient participated  Level of consciousness: awake and awake and alert  Airway patency: patent  Nausea & Vomiting: no nausea and no vomiting  Cardiovascular status: blood pressure returned to baseline and hemodynamically stable  Respiratory status: acceptable  Hydration status: euvolemic  Pain management: adequate    No notable events documented.

## 2024-01-22 NOTE — DISCHARGE INSTRUCTIONS
Dr. Barry Pollard Jr., MD  [facility]    Post Operative Instructions for Total Knee Replacement    Incision and dressing  Your incision is covered with a waterproof Aquacel dressing.  It has been impregnated with silver nitrate to help rey off infection.  The dressing is to be removed 7 days after the date of your surgery.    The incision has been closed with skin glue.  The glue will flake off over time and fall off on its own.  DO NOT apply any lotions, creams, peroxide or Betadine to the skin glue, as it will degrade and dissolve the glue.  DO NOT peel the glue off, as this could result in opening of the incision.  There are no sutures that need to be removed; the skin and subcutaneous layers have been closed with dissolvable sutures, which will dissolve over 7 to 8 weeks.    Showering  The Aquacel dressing is waterproof.  You may shower when you feel ready.  Once the Aquacel dressing has been removed, you may continue to shower.  The glue provides a waterproof seal to the incision.  Let the water run over the incision, and pat dry with a towel.  Do not scrub or rub the glue.    Compression stockings  The compression stockings/RICKIE hose are used to help reduce swelling and assist in the prevention of blood clots.  They are to be worn on the operative leg for 3 weeks.  They should be worn full-time during the day, but may be removed at nighttime or during periods of elevation during the day.  They are optional on the nonoperative leg, depending on how much swelling may be encountered.    Activity  You will begin activity immediately after surgery.  Physical therapy will commence (at home or as an outpatient) within a few days of surgery.  You may bear weight on the operative leg as tolerated.  It is encouraged that you get up for short walks frequently throughout the day.  Pump your ankles up and down at least 10 times every hour while you are awake, or if you are standing, stand on your toes 10

## 2024-01-22 NOTE — ANESTHESIA PROCEDURE NOTES
Peripheral Block    Patient location during procedure: pre-op  Reason for block: post-op pain management and at surgeon's request  Start time: 1/22/2024 8:46 AM  End time: 1/22/2024 8:56 AM  Staffing  Performed: anesthesiologist   Anesthesiologist: Wayne Haro MD  Performed by: Wayne Haro MD  Authorized by: Wayne Haro MD    Preanesthetic Checklist  Completed: patient identified, IV checked, site marked, risks and benefits discussed, surgical/procedural consents, equipment checked, pre-op evaluation, timeout performed, anesthesia consent given, oxygen available and monitors applied/VS acknowledged  Peripheral Block   Patient position: supine  Prep: ChloraPrep  Provider prep: mask and sterile gloves (Sterile probe cover)  Patient monitoring: cardiac monitor, continuous pulse ox, frequent blood pressure checks and IV access  Block type: Femoral  Adductor canal  Laterality: right  Injection technique: single-shot  Guidance: nerve stimulator and ultrasound guided  Local infiltration: ropivacaine  Infiltration strength: 0.5 %  Local infiltration: ropivacaine  Dose: 20 mL    Needle   Needle type: combined needle/nerve stimulator   Needle gauge: 22 G  Needle localization: anatomical landmarks and ultrasound guidance  Needle length: 5 cm  Assessment   Injection assessment: negative aspiration for heme, no paresthesia on injection and local visualized surrounding nerve on ultrasound  Paresthesia pain: immediately resolved  Slow fractionated injection: yes  Hemodynamics: stable  Real-time US image taken/store: yes    Additional Notes  Ultrasound image printed and saved in patient chart.    Sterile probe cover used    Medications Administered  ropivacaine (NAROPIN) injection 0.5% - Perineural   20 mL - 1/22/2024 8:46:00 AM

## 2024-01-22 NOTE — OP NOTE
Operative Note      Patient: Meme MARROQUIN Case  YOB: 1950  MRN: [id number]    Date of Procedure: 1/22/2024    Pre-Op Diagnosis Codes:     * Osteoarthritis of left knee [M17.12]    Post-Op Diagnosis: Same       Procedure(s):  Left total knee arthroplasty,[place] Triathlon Total Knee System,Choice with adductor canal block    The procedure needs to be off coded with a 2 2 modifier due to the fact that the patient's BMI is 41.10.  Her body habitus was such that the exposure required additional soft tissue releases and retraction above and beyond what is normally required for a primary total knee.  It also necessitated the use of a second assistant.    Surgeon(s):  Barry Pollard MD    Assistant:   Physician Assistant: Konstantin Barrios PA-C -his assistance consisted of assistance with positioning of the limb retraction and and closing the wound    Anesthesia: Choice    Estimated Blood Loss (mL): less than 100     Complications: None    Specimens:   * No specimens in log *    Implants:  Implant Name Type Inv. Item Serial No.  Lot No. LRB No. Used Action   CEMENT BNE RADIOPAQUE FAST SET ACRYL RESIN HI VISC SIMPLEXHV - [id number]  CEMENT BNE RADIOPAQUE FAST SET ACRYL RESIN HI VISC [id number]  [employer]olographic Projection for Architecture [id number] Left 1 Implanted   BASEPLATE TIB SZ 5 KNEE TRITANIUM SANDRINE CARYL LO PROF TRIATHLON - [id number]  BASEPLATE TIB SZ 5 KNEE TRITANIUM SANDRINE CARYL LO PROF TRIATHLON [id number] [employer] Projection for [employer] [id number] Left 1 Implanted   COMPONENT FEM SZ 5 L ANT KNEE CRUCE RET SANDRINE REFERENCING - [id number]  COMPONENT FEM SZ 5 L ANT KNEE CRUCE RET SANDRINE REFERENCING [id number] [employer]- UJAUL Left 1 Implanted   IMPLANT PATELLAR [id number] X3 ASYMMETRIC TRIATHLON - [id number]  IMPLANT PATELLAR [id number] EVT39LY X3 ASYMMETRIC TRIATHLON [id number] [employer] YYKM Left 1 Implanted   [id number] - [id number]   [id number]  [id number] Left 1 Implanted         Drains: * No LDAs found

## 2024-01-22 NOTE — ANESTHESIA PRE PROCEDURE
POC Tests: No results for input(s): \"POCGLU\", \"POCNA\", \"POCK\", \"POCCL\", \"POCBUN\", \"POCHEMO\", \"POCHCT\" in the last 72 hours.    Coags:   Lab Results   Component Value Date/Time    PROTIME 12.9 07/17/2023 10:27 AM    INR 1.0 07/17/2023 10:27 AM       HCG (If Applicable): No results found for: \"PREGTESTUR\", \"PREGSERUM\", \"HCG\", \"HCGQUANT\"     ABGs: No results found for: \"PHART\", \"PO2ART\", \"ISF0CZS\", \"OHM0ETE\", \"BEART\", \"Y4YSERIR\"     Type & Screen (If Applicable):  No results found for: \"LABABO\", \"LABRH\"    Drug/Infectious Status (If Applicable):  No results found for: \"HIV\", \"HEPCAB\"    COVID-19 Screening (If Applicable): No results found for: \"COVID19\"        Anesthesia Evaluation  Patient summary reviewed and Nursing notes reviewed no history of anesthetic complications:   Airway: Mallampati: II  TM distance: >3 FB   Neck ROM: full  Mouth opening: > = 3 FB   Dental: normal exam         Pulmonary:Negative Pulmonary ROS and normal exam    (+) sleep apnea:                             Cardiovascular:Negative CV ROS  Exercise tolerance: good (>4 METS),         ECG reviewed               Beta Blocker:  Not on Beta Blocker         Neuro/Psych:   Negative Neuro/Psych ROS              GI/Hepatic/Renal: Neg GI/Hepatic/Renal ROS  (+) morbid obesity         ROS comment: BMI 42.   Endo/Other: Negative Endo/Other ROS   (+) hypothyroidism::., .          Pt had PAT visit.       Abdominal:             Vascular: negative vascular ROS.         Other Findings:             Anesthesia Plan      MAC and spinal     ASA 3       Induction: intravenous.    MIPS: Postoperative opioids intended and Prophylactic antiemetics administered.  Anesthetic plan and risks discussed with patient.        Attending anesthesiologist reviewed and agrees with Pre Eval content      Post-op pain plan if not by surgeon: rivera Haro MD   1/22/2024

## 2024-01-22 NOTE — H&P
The history and physical in the electronic medical record dated 1/15/24 was personally reviewed.  There are no interval changes that need to be made.  Plan is to proceed with a left total knee as scheduled. The patient is opted to proceed with a knee replacement surgery.  I brought the model in, and we sat down and talked about surgery.  We talked about the recovery.  I stressed the importance of physical therapy and active participation in physical therapy to the patient in order to achieve a satisfactory postoperative outcome.  We went over the risks of surgery.  Risks include, but are not limited to, infection, neurovascular injury, hematoma formation, wound healing complications, blood clot, persistent postoperative pain, ligament injury, and risks of anesthesia.  The patient expressed understanding and wishes to proceed with a total knee replacement as above.

## 2024-01-22 NOTE — ANESTHESIA PROCEDURE NOTES
Spinal Block    Patient location during procedure: pre-op  End time: 1/22/2024 9:17 AM  Reason for block: primary anesthetic  Staffing  Performed: anesthesiologist   Anesthesiologist: Wayne Haro MD  Performed by: Wayne Haro MD  Authorized by: Wayne Haro MD    Spinal Block  Patient position: sitting  Prep: ChloraPrep and site prepped and draped  Patient monitoring: continuous pulse ox, frequent blood pressure checks and oxygen  Approach: midline  Location: L3/L4  Provider prep: mask and sterile gloves  Local infiltration: lidocaine  Needle  Needle type: Pencan   Needle gauge: 25 G  Needle length: 3.5 in  Assessment  Swirl obtained: Yes  CSF: clear  Attempts: 1  Hemodynamics: stable  Preanesthetic Checklist  Completed: patient identified, IV checked, site marked, risks and benefits discussed, surgical/procedural consents, equipment checked, pre-op evaluation, timeout performed, anesthesia consent given, oxygen available, monitors applied/VS acknowledged, fire risk safety assessment completed and verbalized and blood product R/B/A discussed and consented

## 2024-01-23 VITALS
TEMPERATURE: 98.8 F | DIASTOLIC BLOOD PRESSURE: 60 MMHG | HEIGHT: 63 IN | HEART RATE: 74 BPM | RESPIRATION RATE: 16 BRPM | WEIGHT: 244.4 LBS | OXYGEN SATURATION: 95 % | SYSTOLIC BLOOD PRESSURE: 130 MMHG | BODY MASS INDEX: 43.3 KG/M2

## 2024-01-23 LAB
ANION GAP SERPL CALCULATED.3IONS-SCNC: 9 MEQ/L (ref 9–15)
BUN SERPL-MCNC: 19 MG/DL (ref 8–23)
CALCIUM SERPL-MCNC: 8.9 MG/DL (ref 8.5–9.9)
CHLORIDE SERPL-SCNC: 106 MEQ/L (ref 95–107)
CO2 SERPL-SCNC: 23 MEQ/L (ref 20–31)
CREAT SERPL-MCNC: 0.46 MG/DL (ref 0.5–0.9)
ERYTHROCYTE [DISTWIDTH] IN BLOOD BY AUTOMATED COUNT: 12.6 % (ref 11.7–14.4)
GLUCOSE SERPL-MCNC: 103 MG/DL (ref 70–99)
HCT VFR BLD AUTO: 33 % (ref 37–47)
HGB BLD-MCNC: 10.6 G/DL (ref 11.2–15.7)
MCH RBC QN AUTO: 29.7 PG (ref 25.6–32.2)
MCHC RBC AUTO-ENTMCNC: 32.1 % (ref 32.2–35.5)
MCV RBC AUTO: 92.4 FL (ref 79.4–94.8)
PLATELET # BLD AUTO: 168 K/UL (ref 182–369)
POTASSIUM SERPL-SCNC: 4.3 MEQ/L (ref 3.4–4.9)
RBC # BLD AUTO: 3.57 M/UL (ref 3.93–5.22)
SODIUM SERPL-SCNC: 138 MEQ/L (ref 135–144)
WBC # BLD AUTO: 5.9 K/UL (ref 4–10)

## 2024-01-23 PROCEDURE — 85027 COMPLETE CBC AUTOMATED: CPT

## 2024-01-23 PROCEDURE — 97530 THERAPEUTIC ACTIVITIES: CPT

## 2024-01-23 PROCEDURE — 6370000000 HC RX 637 (ALT 250 FOR IP): Performed by: INTERNAL MEDICINE

## 2024-01-23 PROCEDURE — 6370000000 HC RX 637 (ALT 250 FOR IP): Performed by: NURSE PRACTITIONER

## 2024-01-23 PROCEDURE — 97110 THERAPEUTIC EXERCISES: CPT

## 2024-01-23 PROCEDURE — 97535 SELF CARE MNGMENT TRAINING: CPT

## 2024-01-23 PROCEDURE — 97116 GAIT TRAINING THERAPY: CPT

## 2024-01-23 PROCEDURE — 6360000002 HC RX W HCPCS: Performed by: NURSE PRACTITIONER

## 2024-01-23 PROCEDURE — G0378 HOSPITAL OBSERVATION PER HR: HCPCS

## 2024-01-23 PROCEDURE — 80048 BASIC METABOLIC PNL TOTAL CA: CPT

## 2024-01-23 PROCEDURE — 2580000003 HC RX 258: Performed by: NURSE PRACTITIONER

## 2024-01-23 PROCEDURE — 36415 COLL VENOUS BLD VENIPUNCTURE: CPT

## 2024-01-23 RX ORDER — DOCUSATE SODIUM 100 MG/1
100 CAPSULE, LIQUID FILLED ORAL 3 TIMES DAILY PRN
Qty: 30 CAPSULE | Refills: 1 | Status: SHIPPED | OUTPATIENT
Start: 2024-01-23 | End: 2024-02-22

## 2024-01-23 RX ORDER — OXYCODONE HYDROCHLORIDE 5 MG/1
5 TABLET ORAL EVERY 6 HOURS PRN
Qty: 28 TABLET | Refills: 0 | Status: SHIPPED | OUTPATIENT
Start: 2024-01-23 | End: 2024-01-30

## 2024-01-23 RX ORDER — POLYETHYLENE GLYCOL 3350 17 G/17G
17 POWDER, FOR SOLUTION ORAL 2 TIMES DAILY
Status: DISCONTINUED | OUTPATIENT
Start: 2024-01-23 | End: 2024-01-23 | Stop reason: HOSPADM

## 2024-01-23 RX ORDER — ACETAMINOPHEN 500 MG
1000 TABLET ORAL 3 TIMES DAILY
Qty: 42 TABLET | Refills: 1 | Status: SHIPPED | OUTPATIENT
Start: 2024-01-23

## 2024-01-23 RX ORDER — TIZANIDINE 4 MG/1
4 TABLET ORAL EVERY 6 HOURS PRN
Qty: 21 TABLET | Refills: 1 | Status: SHIPPED | OUTPATIENT
Start: 2024-01-23

## 2024-01-23 RX ORDER — CELECOXIB 200 MG/1
200 CAPSULE ORAL 2 TIMES DAILY
Qty: 14 CAPSULE | Refills: 0 | Status: SHIPPED | OUTPATIENT
Start: 2024-01-23

## 2024-01-23 RX ORDER — ASPIRIN 81 MG/1
81 TABLET ORAL 2 TIMES DAILY
Qty: 56 TABLET | Refills: 0 | Status: SHIPPED | OUTPATIENT
Start: 2024-01-23

## 2024-01-23 RX ADMIN — LEVOTHYROXINE SODIUM 112 MCG: 0.11 TABLET ORAL at 05:34

## 2024-01-23 RX ADMIN — SENNOSIDES AND DOCUSATE SODIUM 1 TABLET: 8.6; 5 TABLET ORAL at 09:13

## 2024-01-23 RX ADMIN — ATORVASTATIN CALCIUM 10 MG: 10 TABLET, FILM COATED ORAL at 09:13

## 2024-01-23 RX ADMIN — ALUMINUM HYDROXIDE, MAGNESIUM HYDROXIDE, AND SIMETHICONE 15 ML: 200; 200; 20 SUSPENSION ORAL at 13:27

## 2024-01-23 RX ADMIN — ACETAMINOPHEN 650 MG: 325 TABLET ORAL at 13:26

## 2024-01-23 RX ADMIN — OXYCODONE HYDROCHLORIDE 5 MG: 5 TABLET ORAL at 01:41

## 2024-01-23 RX ADMIN — ACETAMINOPHEN 650 MG: 325 TABLET ORAL at 05:34

## 2024-01-23 RX ADMIN — ASPIRIN 81 MG: 81 TABLET, COATED ORAL at 09:12

## 2024-01-23 RX ADMIN — CYCLOBENZAPRINE 5 MG: 10 TABLET, FILM COATED ORAL at 05:34

## 2024-01-23 RX ADMIN — OXYCODONE HYDROCHLORIDE 5 MG: 5 TABLET ORAL at 09:12

## 2024-01-23 RX ADMIN — FAMOTIDINE 20 MG: 20 TABLET, FILM COATED ORAL at 09:13

## 2024-01-23 RX ADMIN — CEFAZOLIN 2000 MG: 2 INJECTION, POWDER, FOR SOLUTION INTRAMUSCULAR; INTRAVENOUS at 01:41

## 2024-01-23 RX ADMIN — OXYCODONE HYDROCHLORIDE 5 MG: 5 TABLET ORAL at 13:27

## 2024-01-23 RX ADMIN — KETOROLAC TROMETHAMINE 7.5 MG: 15 INJECTION, SOLUTION INTRAMUSCULAR; INTRAVENOUS at 05:35

## 2024-01-23 ASSESSMENT — PAIN SCALES - GENERAL
PAINLEVEL_OUTOF10: 10
PAINLEVEL_OUTOF10: 2
PAINLEVEL_OUTOF10: 5

## 2024-01-23 ASSESSMENT — PAIN DESCRIPTION - DESCRIPTORS
DESCRIPTORS: ACHING

## 2024-01-23 ASSESSMENT — PAIN DESCRIPTION - ORIENTATION
ORIENTATION: LEFT
ORIENTATION: LEFT

## 2024-01-23 ASSESSMENT — PAIN DESCRIPTION - ONSET: ONSET: ON-GOING

## 2024-01-23 ASSESSMENT — PAIN DESCRIPTION - LOCATION
LOCATION: KNEE
LOCATION: ABDOMEN;KNEE

## 2024-01-23 NOTE — DISCHARGE INSTR - COC
Continuity of Care Form    Patient Name: Meme Bar   :  1950  MRN:  [id number]    Admit date:  2024  Discharge date:  24    Code Status Order: Full Code   Advance Directives:   Advance Care Flowsheet Documentation       Date/Time Healthcare Directive Type of Healthcare Directive Copy in Chart Healthcare Agent Appointed Healthcare Agent's Name Healthcare Agent's Phone Number    24 0819 No, patient does not have an advance directive for healthcare treatment -- -- -- -- --            Admitting Physician:  Barry Pollard MD  PCP: Carlos Reid DO    Discharging Nurse: Rosey KRISHNAMURTHY  Discharging Hospital Unit/Room#: 0218/0218-01  Discharging Unit Phone Number: [phone]    Emergency Contact:   Extended Emergency Contact Information  Primary Emergency Contact: SYEDA BAR  Home Phone: [phone]  Mobile Phone: [phone]  Relation: Spouse  Preferred language: English   needed? No    Past Surgical History:  Past Surgical History:   Procedure Laterality Date    HYSTEROSCOPY  2021    w/ polypectomy    TOTAL KNEE ARTHROPLASTY Right 2023    Right total knee arthroplasty, [place] Triathlon Total Knee System, Anesthesia Requested: Choice with adductor canal block  (PAT WITH DAN 23 9:00 AM & LAB 23 AT 10:00 AM) performed by Barry Pollard MD at [facility] OR    TOTAL KNEE ARTHROPLASTY Left 2024    Left total knee arthroplasty,[place] Triathlon Total Knee System,Choice with adductor canal block performed by Barry Pollard MD at [facility] OR    WRIST FRACTURE SURGERY      left 2 pins placed       Immunization History:   Immunization History   Administered Date(s) Administered    COVID-19, PFIZER Bivalent, DO NOT Dilute, (age 12y+), IM, 30 mcg/0.3 mL 2022    COVID-19, PFIZER GRAY top, DO NOT Dilute, (age 12 y+), IM, 30 mcg/0.3 mL 2022    COVID-19, PFIZER PURPLE top, DILUTE for use, (age 12 y+), 30mcg/0.3mL 10/29/2021    COVID-19, PFIZER, ( formula),

## 2024-01-23 NOTE — PLAN OF CARE
Problem: Discharge Planning  Goal: Discharge to home or other facility with appropriate resources  1/23/2024 1527 by Rosey Sanchez RN  Outcome: Completed  Flowsheets (Taken 1/23/2024 0906)  Discharge to home or other facility with appropriate resources: Identify barriers to discharge with patient and caregiver  1/23/2024 0404 by Ya Dubon RN  Outcome: Progressing  Flowsheets (Taken 1/22/2024 1500 by Tracie Easley RN)  Discharge to home or other facility with appropriate resources: Identify discharge learning needs (meds, wound care, etc)     Problem: Pain  Goal: Verbalizes/displays adequate comfort level or baseline comfort level  1/23/2024 1527 by Rosey Sanchez RN  Outcome: Completed  1/23/2024 0404 by Ya Dubon RN  Outcome: Progressing     Problem: Safety - Adult  Goal: Free from fall injury  1/23/2024 1527 by Rosey Sanchez RN  Outcome: Completed  1/23/2024 0404 by Ya Dubon RN  Outcome: Progressing

## 2024-01-23 NOTE — PLAN OF CARE
Problem: Discharge Planning  Goal: Discharge to home or other facility with appropriate resources  Outcome: Progressing  Flowsheets (Taken 1/22/2024 1500 by Tracie Easley RN)  Discharge to home or other facility with appropriate resources: Identify discharge learning needs (meds, wound care, etc)     Problem: Pain  Goal: Verbalizes/displays adequate comfort level or baseline comfort level  Outcome: Progressing     Problem: Safety - Adult  Goal: Free from fall injury  Outcome: Progressing

## 2024-01-23 NOTE — CONSULTS
Consult      Patient:  Meme MARROQUIN Case  YOB: 1950    MRN: [id number]     Acct: [id number]    Primary Care Physician: Carlos Reid DO    HISTORY OF PRESENT ILLNESS:   History obtained from chart review and the patient.    The patient is a 73 y.o. female whom I have been requested to see by Dr. Pollard for evaluation of  HLP/hypothyroidism. Patient has long standing history of OA, came for scheduled R  LTKA. Was seeing and evaluated in POD x1. Per her report she takes synthroid and statin regularly. Has no cardiac history and was seeing cardiology service in preoperative clearance. Denied fever/dyspnea/CP/SOB. Participating with PT today AM      Past Medical History:        Diagnosis Date    Hyperlipidemia     Hypothyroidism     Nocturia     NHI (obstructive sleep apnea)     Osteoarthritis     Polyp of corpus uteri     Stenosis of cervix     Uterine leiomyoma        Past Surgical History:        Procedure Laterality Date    HYSTEROSCOPY  06/04/2021    w/ polypectomy    TOTAL KNEE ARTHROPLASTY Right 7/24/2023    Right total knee arthroplasty, [place] Triathlon Total Knee System, Anesthesia Requested: Choice with adductor canal block  (PAT WITH DAN 7/17/23 9:00 AM & LAB 7/17/23 AT 10:00 AM) performed by Barry Pollard MD at [facility] OR    TOTAL KNEE ARTHROPLASTY Left 1/22/2024    Left total knee arthroplasty,[place] Triathlon Total Knee System,Choice with adductor canal block performed by Barry Pollard MD at [facility] OR    WRIST FRACTURE SURGERY  2005    left 2 pins placed       Medications:    No current facility-administered medications on file prior to encounter.     Current Outpatient Medications on File Prior to Encounter   Medication Sig Dispense Refill    calcium citrate (CALCITRATE) 950 (200 Ca) MG tablet Take 1 tablet by mouth daily Pt takes OTC      Misc. Devices (BATH/SHOWER SEAT) MISC Dispense 1 Shower/Bath seat 1 each 0    Misc. Devices (CLASSICS ROLLING WALKER) MISC Dispense 1 rolling walker 1

## 2024-01-23 NOTE — PROGRESS NOTES
12:03 Received pt from PACU, slightly drowsy, VSS, oriented x 4, denies c/o pain at this time.   
Department of Orthopedic Surgery  Joint Service  Attending Progress Note        Subjective:  No complaints    Vitals  VITALS:  /60   Pulse 74   Temp 99.7 °F (37.6 °C)   Resp 16   Ht 1.6 m (5' 3\")   Wt 110.9 kg (244 lb 6.4 oz)   SpO2 94%   BMI 43.29 kg/m²     PHYSICAL EXAM:    Orientation:  alert and oriented to person, place and time    Left Lower Extremity    Incision:  dressing in place, clean, dry, and intact    Lower Extremity Motor :   Quadriceps:  5/5  Extensor hallucis:  5/5  Dorsiflexion:  5/5  Plantarflexion:  5/5    Lower Extremity Sensory:  Tibial Nerve:  intact  Superficial Peroneal Nerve:  intact  Deep Peroneal Nerve:  intact    Pulses:    Posterior Tibial:  2  Dorsalis Pedis:  2  Posterior Tibial Artery:  2    Abnormal Exam findings:  none    Brace:  no    LABS:    HgB:    Lab Results   Component Value Date/Time    HGB 10.6 01/23/2024 06:05 AM     INR:  No results found for: \"PTINR\"  CMP:    Lab Results   Component Value Date/Time     01/23/2024 06:06 AM    K 4.3 01/23/2024 06:06 AM     01/23/2024 06:06 AM    CO2 23 01/23/2024 06:06 AM    BUN 19 01/23/2024 06:06 AM    CREATININE 0.46 01/23/2024 06:06 AM    LABGLOM >60.0 01/23/2024 06:06 AM    GLUCOSE 103 01/23/2024 06:06 AM    CALCIUM 8.9 01/23/2024 06:06 AM     BMP:    Lab Results   Component Value Date/Time     01/23/2024 06:06 AM    K 4.3 01/23/2024 06:06 AM     01/23/2024 06:06 AM    CO2 23 01/23/2024 06:06 AM    BUN 19 01/23/2024 06:06 AM    CREATININE 0.46 01/23/2024 06:06 AM    CALCIUM 8.9 01/23/2024 06:06 AM    LABGLOM >60.0 01/23/2024 06:06 AM    GLUCOSE 103 01/23/2024 06:06 AM       ASSESSMENT AND PLAN:    Post operative day 1 status post left total knee arthroplasty    1:  Weight bearing as tolerated  2:  Deep venous thrombosis prophylaxis - asa  3:  Continue physical therapy  4:  D/C Plan:  Home Health  5:  Pain Control:  good  6:  D/C home today  
Facility/Department: [facility] SURG UNIT  Physical Therapy Initial Assessment    Name: Meme Bar  : 1950  MRN: [id number]  Date of Service: 2024    Discharge Recommendations:  Continue to assess pending progress          Patient Diagnosis(es): The encounter diagnosis was Status post total left knee replacement.  Past Medical History:  has a past medical history of Hyperlipidemia, Hypothyroidism, Nocturia, NHI (obstructive sleep apnea), Osteoarthritis, Polyp of corpus uteri, Stenosis of cervix, and Uterine leiomyoma.  Past Surgical History:  has a past surgical history that includes hysteroscopy (2021); Wrist fracture surgery (); and Total knee arthroplasty (Right, 2023).    Assessment   Assessment: 73yof post L TKA due to L knee OA with hihg pain c/o 10/10 and difficutly wlakinig Pt verbally participative, feeling nauseous,. No emesis. Pt to BSC with PT and OT together due to pain and risk for falls. Recommend 2 person assist initially. Pt appropriate for skilled PT postop. Dependnet upon stair success recommend [facility] vs [facility] subacute stay. Stairs To be tested tomorrow 24. Pt agrees to plan and goals.  Treatment Diagnosis: difficulty walking and LLE weakness and pain post L TKA due to OA  Requires PT Follow-Up: Yes  Activity Tolerance  Activity Tolerance: Patient limited by pain     Plan   Physical Therapy Plan  General Plan: 5-7 times per week (1-2 x per day)  Current Treatment Recommendations: Strengthening, ROM, Balance training, Functional mobility training, Endurance training, Safety education & training, Patient/Caregiver education & training, Equipment evaluation, education, & procurement, Gait training, Stair training, Transfer training, Manual, Modalities, Positioning, Therapeutic activities  Additional Comments: KT tape     Restrictions  Restrictions/Precautions  Restrictions/Precautions: Weight Bearing  Implants present? : Metal implants (R TKA , now L TKA 24)  Lower 
Laura Terrell Initial Discharge Assessment    Met with Patient to discuss discharge plan.        PCP: Carlos Reid DO                                  Date of Last Visit: \"October 1st but than I saw the PA within the 30 day margin\"     If no PCP, list provided? N/A    Discharge Planning    Living Arrangements: independently at home    Who do you live with? Spouse     Who helps you with your care:  self    If lives at home:     Do you have any barriers navigating in your home? no    Patient can perform ADL?  Yes    Current Services (outpatient and in home) :  None    Dialysis: No    Is transportation available to get to your appointments? Yes    DME Equipment:  yes - walk in shower, grab bars, shower chair, quad cane, regular cane and walker.  Patient identifies no DME needs at this time     Respiratory equipment: None    Respiratory provider:  no     Pharmacy:  yes - [facility] [place] for [facility] term and [facility] mail in pharmacy for long term medication     Able to afford cost of medication: Yes    Does patient feel safe at home? Yes    Does patient identify any home going needs? Yes, [employer] [facility]     Patient agreeable to HHC?      Yes, [employer] [facility]     Patient agreeable to SNF/Rehab?     N/A    Other discharge needs identified?      Other Patient reports plan to DC home with C for no more than 2 weeks before going to out patient therapy     Was Freedom of Choice Provided?      Yes    Initial Discharge Plan? (Note: please see concurrent daily documentation for any updates after initial note).      Chart reviewed.  Patient was admitted to [facility] under observation s/p left total knee replacement.  Patient was evaluated by PT/OT.  SS collaborated with therapy whom recommend HHC and prn assist upon DC.  Therapy reports that patient is participating and making progress. Therapy reports no concerns with patient DC today.  SS met with patient and spouse to discuss DC planning and review therapies DC recommendations.  Upon 
Occupational Therapy  Facility/Department: [facility] MED SURG UNIT  Daily Treatment Note  NAME: Meme Bar  : 1950  MRN: [id number]    Date of Service: 2024    Discharge Recommendations:  Continue to assess pending progress         Patient Diagnosis(es): The primary encounter diagnosis was Status post total left knee replacement. A diagnosis of Status post total knee replacement, left was also pertinent to this visit.     Assessment    Assessment: Pt willing for ADL participation ,has previous Knee surgury, and  at home, good awareness for safety , good understanding of AE sock aide educated on use of belt as leg  , undecided if going home this day or not.  Activity Tolerance: Patient tolerated treatment well;Patient limited by pain  Discharge Recommendations: Continue to assess pending progress      Plan   Occupational Therapy Plan  Times Per Week: 3-6x/wk  Current Treatment Recommendations: Balance training;Endurance training;Neuromuscular re-education;Safety education & training;Patient/Caregiver education & training;Self-Care / ADL     Restrictions     Restrictions/Precautions  Restrictions/Precautions: Weight Bearing  Implants present? : Metal implants (R TKA , now L TKA 24)  Lower Extremity Weight Bearing Restrictions  Left Lower Extremity Weight Bearing: Weight Bearing As Tolerated     Subjective     Subjective   Subjective  Subjective: Pt is agreeable to ADL at toilet or sink ,  Pain: Pain is \" much worse than previous knee...           Objective    Vitals     Bed Mobility Training  Bed Mobility Training: Yes  Overall Level of Assistance: Contact-guard assistance  Interventions: Verbal cues  Supine to Sit: Stand-by assistance  Transfer Training  Transfer Training: Yes  Overall Level of Assistance: Contact-guard assistance  Interventions: Safety awareness training;Verbal cues  Sit to Stand: Stand-by assistance  Toilet Transfer: Contact-guard assistance (declined ADL at sink 
Physical Therapy  Facility/Department: [facility] MED SURG UNIT  Daily Treatment Note  NAME: Meme Bar  : 1950  MRN: [id number]    Date of Service: 2024    Discharge Recommendations:  Home with Home health PT        Patient Diagnosis(es): The primary encounter diagnosis was Status post total left knee replacement. A diagnosis of Status post total knee replacement, left was also pertinent to this visit.    Assessment   Assessment: Pt. with high level of pain moving slowly with step to pattern.  Pt.  having trouble with WB'ing L.  Perofmed steps with Min A and one HR and SPC. as cannot reach B HR at home.  Pain holds 9/10 L knee post.  CP to L knee to dec pain and inflammation.  Activity Tolerance: Patient tolerated treatment well;Patient limited by pain     Plan    Physical Therapy Plan  General Plan: 5-7 times per week (1-2 x per day)  Current Treatment Recommendations: Strengthening;ROM;Balance training;Functional mobility training;Endurance training;Safety education & training;Patient/Caregiver education & training;Equipment evaluation, education, & procurement;Gait training;Stair training;Transfer training;Manual;Modalities;Positioning;Therapeutic activities  Additional Comments: KT tape     Restrictions  Restrictions/Precautions  Restrictions/Precautions: Weight Bearing  Implants present? : Metal implants (R TKA , now L TKA 24)  Lower Extremity Weight Bearing Restrictions  Left Lower Extremity Weight Bearing: Weight Bearing As Tolerated     Subjective    Subjective  Subjective: Pt. reports pain 9/10 L knee. States she is going to go home with [facility].  Pain: 9/10 L knee     Objective   Vitals     Bed Mobility Training  Bed Mobility Training: No  Overall Level of Assistance: Contact-guard assistance  Interventions: Verbal cues  Supine to Sit: Stand-by assistance  Balance  Sitting: Intact  Standing: With support  Transfer Training  Transfer Training: Yes  Overall Level of Assistance: Contact-guard 
Pt A&Ox4 resting in bed. Assessment and vitals are as charted. Patient given new ice bag to apply to knee and pillows per request. Pt states no further needs at this time. Call light and table are within reach, bed alarm on for safety.   
Pt a and o x4. Head to toe complete. DC order written. Pain managed with PRN and scheduled pain meds. Aquacel on LLE C,D,I. Denies N,V. Tolerating PO well. Pt to go home with [facility]. AVS printed and gone over with pt. JAYLAN complete. 6 rx given to pt. Pts  went and got them filled prior to dc. IV removed. Pt wheeled down to front door with aide in stable condition.   
Spiritual Care Services     Summary of Visit:    Encounter Summary  Encounter Overview/Reason : Initial Encounter  Service Provided For:: Patient and family together  Referral/Consult From:: [facility]  Support System: Spouse, Christian/judith community ([employer])  Complexity of Encounter: Low  Begin Time: 1230  End Time : 1245  Total Time Calculated: 15 min        Spiritual/Emotional needs  Type: Spiritual Support                      Spiritual Assessment/Intervention/Outcomes:    Assessment: Calm    Intervention: Sustaining Presence/Ministry of presence, Prayer (assurance of)/[place]    Outcome: Receptive, Connection/Belonging      Care Plan:    Plan and Referrals  Plan/Referrals: Continue Support (comment)          Spiritual Care Services   Electronically signed by Chaplain Arielle on 1/23/2024 at 1:04 PM.    To reach a  for emotional and spiritual support, place an EPIC consult request.   If a  is needed immediately, dial “0” and ask to page the on-call .   
assistance;Stand-by assistance  Stand to Sit: Stand-by assistance;Supervision  Stand Pivot Transfers: Stand-by assistance  Toilet Transfer: Contact-guard assistance (declined ADL at sink performed from toilet)  Gait Training  Gait Training: Yes  Gait  Gait Training: Yes  Overall Level of Assistance: Stand-by assistance  Distance (ft): 60 Feet, 70'  Assistive Device: Walker, rolling   3 steps (4\") CG/SBA using B HRs                Goals  Short Term Goals  Time Frame for Short Term Goals: 3-5 days if needed postop medical pty  Short Term Goal 1: [facility] and FWW  Short Term Goal 2: amb >= 50ft with FWW with <= CGA and better pace, no knee buckling  Short Term Goal 3: 4 stairs wtih 2 rails and <= CGA  Short Term Goal 4: L knee AAROM >= 15- 70 deg  Short Term Goal 5: HEP and safe discharge plan in place  Long Term Goals  Time Frame for Long Term Goals : same as STG  Patient Goals   Patient Goals : \"I need to be able to walk and do stairs to get home.\"    Education       AM-PAC - Mobility              Therapy Time   Individual Concurrent Group Co-treatment   Time In  10:30 am         Time Out  11:25 am         Minutes  55                 Sintia Turner, PTA             
Limits           LUE AROM (degrees)  LUE AROM : WFL  RUE AROM (degrees)  RUE AROM : WFL  LUE Strength  LUE Strength Comment: 4/5  RUE Strength  RUE Strength Comment: 4/5          Goals  Short Term Goals  Short Term Goal 1: Supervision bathing/dressing routine with good balance  Short Term Goal 2: Supervision toileting with good safety  Short Term Goal 3: Supervision stand x 5 minutes for self care routine  Short Term Goal 4: Mod I BUE HEP with handout to maintain strength and activity tolerance for daily routine       Therapy Time   Individual Concurrent Group Co-treatment   Time In 1530         Time Out 1610         Minutes 40                 Lindsay Meier, OT

## 2024-01-29 ENCOUNTER — TELEPHONE (OUTPATIENT)
Dept: ORTHOPEDIC SURGERY | Age: 74
End: 2024-01-29

## 2024-01-29 DIAGNOSIS — Z96.652 STATUS POST TOTAL LEFT KNEE REPLACEMENT: ICD-10-CM

## 2024-01-29 RX ORDER — OXYCODONE HYDROCHLORIDE 5 MG/1
5 TABLET ORAL EVERY 6 HOURS PRN
Qty: 28 TABLET | Refills: 0 | Status: SHIPPED | OUTPATIENT
Start: 2024-01-29 | End: 2024-02-05

## 2024-02-07 DIAGNOSIS — Z96.652 PRESENCE OF TOTAL KNEE JOINT PROSTHESIS, LEFT: Primary | ICD-10-CM

## 2024-02-07 DIAGNOSIS — Z96.652 STATUS POST TOTAL LEFT KNEE REPLACEMENT: ICD-10-CM

## 2024-02-07 RX ORDER — OXYCODONE HYDROCHLORIDE 5 MG/1
5 TABLET ORAL EVERY 6 HOURS PRN
Qty: 28 TABLET | Refills: 0 | Status: SHIPPED | OUTPATIENT
Start: 2024-02-07 | End: 2024-02-14

## 2024-02-07 RX ORDER — OXYCODONE HYDROCHLORIDE 5 MG/1
5 TABLET ORAL EVERY 6 HOURS PRN
Qty: 28 TABLET | Refills: 0 | OUTPATIENT
Start: 2024-02-07 | End: 2024-02-14

## 2024-02-14 ENCOUNTER — HOSPITAL ENCOUNTER (OUTPATIENT)
Dept: GENERAL RADIOLOGY | Age: 74
Discharge: HOME | End: 2024-02-16
Payer: MEDICARE

## 2024-02-14 ENCOUNTER — OFFICE VISIT (OUTPATIENT)
Dept: ORTHOPEDIC SURGERY | Age: 74
End: 2024-02-14

## 2024-02-14 DIAGNOSIS — Z96.652 STATUS POST TOTAL LEFT KNEE REPLACEMENT: Primary | ICD-10-CM

## 2024-02-14 DIAGNOSIS — Z96.652 PRESENCE OF TOTAL KNEE JOINT PROSTHESIS, LEFT: ICD-10-CM

## 2024-02-14 DIAGNOSIS — Z96.652 STATUS POST TOTAL LEFT KNEE REPLACEMENT: ICD-10-CM

## 2024-02-14 PROCEDURE — 73562 X-RAY EXAM OF KNEE 3: CPT

## 2024-02-14 PROCEDURE — 99024 POSTOP FOLLOW-UP VISIT: CPT | Performed by: PHYSICIAN ASSISTANT

## 2024-02-14 RX ORDER — OXYCODONE HYDROCHLORIDE 5 MG/1
5 TABLET ORAL EVERY 8 HOURS PRN
Qty: 21 TABLET | Refills: 0 | Status: SHIPPED | OUTPATIENT
Start: 2024-02-14 | End: 2024-02-21

## 2024-02-14 NOTE — PROGRESS NOTES
Narrative Referring Provider:   Barry Pollard MD      PCP:   Carlos Reid DO    ============================  IMPRESSION/PLAN:  ============================  Meme MARROQUIN Case is s/p left Total knee replacement completed on January 22, 2024.    IMPRESSION: At normal postoperative stage of recovery    PLAN:  Continue current conservative treatment., Rest, Ice, Compression, Elevation PRN., and initiate outpatient physical therapy  Routine follow-up.  Ice compression and elevation  Patient Reassurance: Normal post-operative course discussed with patient.  Patient reassured and supported. All questions answered.    Follow [id number]  No X-Rays Needed    Patient presents today for a a routine 1st post-op visit    Status post op:  left Total knee replacement     BMI: There is no height or weight on file to calculate BMI.    Post-operative recovery was complicated by uneventful/none.    Patient rates their condition as improving.     Does the patient still experience pain? 3/10 pain, aching    Post Op discharge patient location: in home.   Functional Assessment is as follows: is ready to begin to begin outpatient PT.  Functional difficulties: None.  Pain Medication: Tylenol, Oxycodone  Currently Ambulating with: walker    =================================  EXAM: POST OP KNEE  =================================  LeftPost-Operative Knee    Ambulates with a limp favoring the Left    SKIN: Appropriate postop appearance, No evidence of erythema, warmth, discharge or drainage and Incision clean/dry/intact.    Range of motion is 3 degrees in extension and   115 degrees of flexion.    Extension Lag: 3 degrees    Pain with ROM: Yes with deeper flexion    There is moderate effusion.     Mal-alignment: No    Tender to the palpation of Medialfemoral condyle and Medial joint line    Neurovascular Status: Sensation Intact, Moves foot and ankle up & down, 2+ dorsalis pedis and negative homans sign    Stability:Varus/Valgus- Yes,

## 2024-02-21 ENCOUNTER — HOSPITAL ENCOUNTER (OUTPATIENT)
Dept: PHYSICAL THERAPY | Age: 74
Setting detail: THERAPIES SERIES
Discharge: HOME | End: 2024-02-21
Payer: MEDICARE

## 2024-02-21 PROCEDURE — 97140 MANUAL THERAPY 1/> REGIONS: CPT

## 2024-02-21 PROCEDURE — 97162 PT EVAL MOD COMPLEX 30 MIN: CPT

## 2024-02-21 PROCEDURE — 97110 THERAPEUTIC EXERCISES: CPT

## 2024-02-21 ASSESSMENT — PAIN DESCRIPTION - DESCRIPTORS: DESCRIPTORS: ACHING;SORE;DISCOMFORT;TIGHTNESS

## 2024-02-21 ASSESSMENT — PAIN DESCRIPTION - ORIENTATION: ORIENTATION: LEFT;ANTERIOR

## 2024-02-21 ASSESSMENT — PAIN DESCRIPTION - PAIN TYPE: TYPE: SURGICAL PAIN

## 2024-02-21 ASSESSMENT — PAIN DESCRIPTION - LOCATION: LOCATION: KNEE

## 2024-02-21 ASSESSMENT — PAIN SCALES - GENERAL: PAINLEVEL_OUTOF10: 4

## 2024-02-21 NOTE — THERAPY EVALUATION
Physical Therapy: Initial Evaluation    Patient: Meme Bar (73 y.o. female)   Examination Date: 2024  Plan of Care Certification Period: 2024 to 24      :  1950 ;    Confirmed: Yes MRN: [id number]  CSN: [id number]   Insurance: Payor: [facility] MEDICARE / Plan: [facility] MEDICARE ADVANTAGE / Product Type: *No Product type* /   Insurance ID: [id number] - (Medicare Managed) Secondary Insurance (if applicable):    Referring Physician: John Paul Foss PA Dr George/ John Paul Foss   PCP: Carlos Reid DO Visits to Date/Visits Approved:     No Show/Cancelled Appts:      Medical Diagnosis: Status post total left knee replacement [Z96.652] L knee OA post L TKA Dr Pollard  with difficulty walking  Treatment Diagnosis: L TKA with difficulty walking and L knee pain ( Dr Pollard 24)     PERTINENT MEDICAL HISTORY      Self reported health status:: Good    Medical History: Chart Reviewed: Yes   Past Medical History:   Diagnosis Date    Hyperlipidemia     Hypothyroidism     Nocturia     NHI (obstructive sleep apnea)     Osteoarthritis     Polyp of corpus uteri     Stenosis of cervix     Uterine leiomyoma      Surgical History:   Past Surgical History:   Procedure Laterality Date    HYSTEROSCOPY  2021    w/ polypectomy    TOTAL KNEE ARTHROPLASTY Right 2023    Right total knee arthroplasty, [employer] Triathlon Total Knee System, Anesthesia Requested: Choice with adductor canal block  (PAT WITH DAN 23 9:00 AM & LAB 23 AT 10:00 AM) performed by Barry Pollard MD at [facility] OR    TOTAL KNEE ARTHROPLASTY Left 2024    Left total knee arthroplasty,[employer] Triathlon Total Knee System,Choice with adductor canal block performed by Barry Pollard MD at [facility] OR    WRIST FRACTURE SURGERY      left 2 pins placed       Medications:   Current Outpatient Medications:     oxyCODONE (ROXICODONE) 5 MG immediate release tablet, Take 1 tablet by mouth every 8 hours as needed

## 2024-02-23 ENCOUNTER — HOSPITAL ENCOUNTER (OUTPATIENT)
Dept: PHYSICAL THERAPY | Age: 74
Setting detail: THERAPIES SERIES
Discharge: HOME | End: 2024-02-23
Payer: MEDICARE

## 2024-02-23 PROCEDURE — 97110 THERAPEUTIC EXERCISES: CPT

## 2024-02-23 ASSESSMENT — PAIN DESCRIPTION - DESCRIPTORS: DESCRIPTORS: ACHING

## 2024-02-23 ASSESSMENT — PAIN DESCRIPTION - LOCATION: LOCATION: KNEE

## 2024-02-23 ASSESSMENT — PAIN DESCRIPTION - ORIENTATION: ORIENTATION: LEFT

## 2024-02-23 ASSESSMENT — PAIN DESCRIPTION - PAIN TYPE: TYPE: SURGICAL PAIN

## 2024-02-23 ASSESSMENT — PAIN SCALES - GENERAL: PAINLEVEL_OUTOF10: 2

## 2024-02-23 NOTE — PROGRESS NOTES
Physical Therapy: Daily Note   Patient: Meme Bar (73 y.o. female)   Examination Date: 2024  Plan of Care/Certification Expiration Date: 24    Progress Note Counter: DC to HEP 10/12/23     :  1950 # of Visits since SOC:   2   MRN: [id number]  CSN: [id number] Start of Care Date:   2024   Insurance: Payor: [facility] MEDICARE / Plan: [facility] MEDICARE ADVANTAGE / Product Type: *No Product type* /   Insurance ID: [id number] - (Medicare Managed) Secondary Insurance (if applicable):    Referring Physician: John Paul Foss PA Dr George/ John Paul Foss   PCP: Carlos Reid DO Visits to Date/Visits Approved:     No Show/Cancelled Appts: 0 / 0     Medical Diagnosis: No admission diagnoses are documented for this encounter.    Treatment Diagnosis: L TKA with difficulty walking and L knee pain ( Dr Pollard 24)        SUBJECTIVE EXAMINATION   Pain Level: Pain Screening  Patient Currently in Pain: Yes  Pain Assessment: 0-10  Pain Level: 2  Pain Type: Surgical pain  Pain Location: Knee  Pain Orientation: Left  Pain Descriptors: Aching    Patient Comments: Subjective: Pt states 2/10 l knee pain and has taken 2 tylenol prior to PT.    HEP Compliance: Good  Any changes to allergies, medications, or have you had any medical procedures/ER visits since your last visit?: No     OBJECTIVE EXAMINATION   Restrictions:  Restrictions/Precautions: Fall Risk   Required Braces or Orthoses?: No   Implants present? : Metal implants (old R TKA, new L TKA)            Left AROM  Right AROM      AROM LLE (degrees)  L Knee Flexion (0-145): Supine 10-90 degrees, AROM  L Knee Extension (0): 10 from neutral         TREATMENT     Exercises:      Treatment Reasoning    Exercise 1: heelsides left with sheet 10 hold x 5 reps  Exercise 2: L SLR 3 hold and 3 slow lower x 5 reps x 2 sets  Exercise 4: supine L hip abd 5 hold x 10 reps  Exercise 5: bridging with ball  3-5 hold x 5 reps  Exercise 6: Seated LAQ's x 10, H3, 3 slow to

## 2024-02-26 ENCOUNTER — HOSPITAL ENCOUNTER (OUTPATIENT)
Dept: PHYSICAL THERAPY | Age: 74
Setting detail: THERAPIES SERIES
Discharge: HOME | End: 2024-02-26
Payer: MEDICARE

## 2024-02-26 PROCEDURE — 97110 THERAPEUTIC EXERCISES: CPT

## 2024-02-26 PROCEDURE — 97140 MANUAL THERAPY 1/> REGIONS: CPT

## 2024-02-26 ASSESSMENT — PAIN SCALES - GENERAL: PAINLEVEL_OUTOF10: 3

## 2024-02-26 NOTE — PROGRESS NOTES
Physical Therapy: Daily Note   Patient: Meme Bar (73 y.o. female)   Examination Date: 2024  Plan of Care/Certification Expiration Date: 24    Progress Note Counter: DC to HEP 10/12/23     :  1950 # of Visits since SOC:   3   MRN: [id number]  CSN: [id number] Start of Care Date:   2024   Insurance: Payor: [facility] MEDICARE / Plan: [facility] MEDICARE ADVANTAGE / Product Type: *No Product type* /   Insurance ID: [id number] - (Medicare Managed) Secondary Insurance (if applicable):    Referring Physician: John Paul Foss PA Dr George/ John Paul Foss   PCP: Carlos Reid DO Visits to Date/Visits Approved: 3 / 12    No Show/Cancelled Appts: 0 / 0     Medical Diagnosis: No admission diagnoses are documented for this encounter.    Treatment Diagnosis: L TKA with difficulty walking and L knee pain ( Dr Pollard 24)        SUBJECTIVE EXAMINATION   Pain Level: Pain Screening  Patient Currently in Pain: Yes  Pain Assessment: 0-10  Pain Level: 3 (groin 3/10, \"achy in L knee\")    Patient Comments: Subjective: Pt states 2/10 l knee pain and has taken 2 tylenol prior to PT.    HEP Compliance: Good        OBJECTIVE EXAMINATION   Restrictions:  Restrictions/Precautions: Fall Risk   Required Braces or Orthoses?: No   Implants present? : Metal implants (old R TKA, new L TKA)        Left AROM  Right AROM      AROM LLE (degrees)  L Knee Flexion (0-145): 2-97 deg AROM post manual (seated)         TREATMENT     Exercises:      Treatment Reasoning    Exercise 1: heelsides left with sheet 10 hold x 5 reps  Exercise 8: Recumbant bike (rocking) x 5 mins. focus on slow, controlled movement. seat level 3.5  Exercise 10: seated heel slide with scooting to edge of seat; H5'' x 5  Exercise 11: seated hamstring stretch; H30'' x 3                          Manual Therapy: (CPT 26622) Treatment Reasoning     Joint Mobilization: AP mobs in seated to L knee to break up scar tissue and dec tightness  Other: KT tape I strip at groin

## 2024-02-28 ENCOUNTER — HOSPITAL ENCOUNTER (OUTPATIENT)
Dept: PHYSICAL THERAPY | Age: 74
Setting detail: THERAPIES SERIES
Discharge: HOME | End: 2024-02-28
Payer: MEDICARE

## 2024-02-28 PROCEDURE — 97110 THERAPEUTIC EXERCISES: CPT

## 2024-02-28 PROCEDURE — 97140 MANUAL THERAPY 1/> REGIONS: CPT

## 2024-02-28 NOTE — PROGRESS NOTES
Physical Therapy  Daily Treatment Note  Date: 2024  Patient Name: Meme Bar  MRN: [id number]     :   1950    Referring Physician: John Paul Foss PA     PCP: Carlos Reid DO    Medical Diagnosis: No admission diagnoses are documented for this encounter.    Treatment Diagnosis: L TKA with difficulty walking and L knee pain ( Dr Pollard 24)      Insurance: Payor: [employer] MEDICARE / Plan: [facility] MEDICARE ADVANTAGE / Product Type: *No Product type* /   Insurance ID: [id number] - (Medicare Managed)         Signed             Physical Therapy: Daily Note   Patient: Meme Bar (73 y.o. female)   Examination Date: 2024  Plan of Care/Certification Expiration Date: 24     Progress Note Counter: DC to HEP 10/12/23      :  1950 # of Visits since SOC:   3   MRN: [id number]  CSN: [id number] Start of Care Date:   2024   Insurance: Payor: [facility] MEDICARE / Plan: [facility] MEDICARE ADVANTAGE / Product Type: *No Product type* /   Insurance ID: [id number] - (Medicare Managed) Secondary Insurance (if applicable):    Referring Physician: John Paul Foss PA Dr George/ John Paul Foss   PCP: Carlos Reid DO Visits to Date/Visits Approved:     No Show/Cancelled Appts: 0 / 0     Medical Diagnosis: No admission diagnoses are documented for this encounter.    Treatment Diagnosis: L TKA with difficulty walking and L knee pain ( Dr Pollard 24)                       Subjective.  Pt. Denies pain but reports stiffness. Reports taking pain pill prior to PT today.     Treatment Activities:  Exercises:      Treatment Reasoning    Exercise 1: heelsides left with sheet 10 hold x 5 reps  Exercise 2: Held SLR as inc pain groin KT helping helping  Exercise 8: Recumbant bike (rocking) x 5 mins.  seat 5.5 initially  focus on slow, controlled movement 5-10 sec holds for ROM  Exercise 10: seated heel slide with scooting to edge of seat; H5'' x 5  Exercise 11: supine manual HS stretch 30 sec x 3  Exercise 12:

## 2024-03-01 ENCOUNTER — HOSPITAL ENCOUNTER (OUTPATIENT)
Dept: PHYSICAL THERAPY | Age: 74
Setting detail: THERAPIES SERIES
Discharge: HOME | End: 2024-03-01
Payer: MEDICARE

## 2024-03-01 PROCEDURE — 97140 MANUAL THERAPY 1/> REGIONS: CPT

## 2024-03-01 PROCEDURE — 97110 THERAPEUTIC EXERCISES: CPT

## 2024-03-01 ASSESSMENT — PAIN DESCRIPTION - LOCATION: LOCATION: KNEE

## 2024-03-01 ASSESSMENT — PAIN DESCRIPTION - DESCRIPTORS: DESCRIPTORS: ACHING

## 2024-03-01 ASSESSMENT — PAIN DESCRIPTION - ORIENTATION: ORIENTATION: LEFT

## 2024-03-01 ASSESSMENT — PAIN DESCRIPTION - PAIN TYPE: TYPE: SURGICAL PAIN

## 2024-03-01 ASSESSMENT — PAIN SCALES - GENERAL: PAINLEVEL_OUTOF10: 3

## 2024-03-01 NOTE — PROGRESS NOTES
Physical Therapy: Treatment Note   Patient: Meme Bar (73 y.o. female)   Examination Date: 2024  Plan of Care/Certification Expiration Date: 24    Progress Note Counter: DC to HEP 10/12/23     :  1950 # of Visits since SOC:      MRN: [id number]  CSN: [id number] Start of Care Date:   2024   Insurance: Payor: [facility] MEDICARE / Plan: [facility] MEDICARE ADVANTAGE / Product Type: *No Product type* /   Insurance ID: [id number] - (Medicare Managed) Secondary Insurance (if applicable):    Referring Physician: John Paul Foss PA Dr George/ John Paul Foss   PCP: Carlos Reid DO Visits to Date/Visits Approved:     No Show/Cancelled Appts: 0 / 0     Medical Diagnosis: No admission diagnoses are documented for this encounter.    Treatment Diagnosis: L TKA with difficulty walking and L knee pain ( Dr Pollard 24)        SUBJECTIVE EXAMINATION   Pain Level: Pain Screening  Patient Currently in Pain: Yes  Pain Assessment: 0-10  Pain Level: 3  Pain Type: Surgical pain  Pain Location: Knee  Pain Orientation: Left  Pain Descriptors: Aching    Patient Comments: Subjective: Pt reports 3/10 left knee pain    HEP Compliance: Good        OBJECTIVE EXAMINATION   Restrictions:  Restrictions/Precautions: Fall Risk   Required Braces or Orthoses?: No   Implants present? : Metal implants (old R TKA, new L TKA)            Left AROM  Left PROM         AROM LLE (degrees)  L Knee Flexion (0-145): Supine 8-105 AROM post manaul  L Knee Extension (0): 6 degrees from full extension PROM and 8 degrees AROM post manual    PROM LLE (degrees)  L Knee Flexion (0-145): Supine   8-105 AROM  L Knee Extension (0): PROM  6 degrees from full extension           TREATMENT     Exercises:      Treatment Reasoning    Exercise 6: Seated LAQ's x 10, H3, 3 slow to lower  Exercise 8: Recumbant bike x 10 min (pt able to do full revoltion in reverse)  Exercise 13: LAQ x 20 ohld 5 sec slow release  Exercise 15: Mini squats x 15  Exercise 16:

## 2024-03-04 ENCOUNTER — HOSPITAL ENCOUNTER (OUTPATIENT)
Dept: PHYSICAL THERAPY | Age: 74
Setting detail: THERAPIES SERIES
Discharge: HOME | End: 2024-03-04
Payer: MEDICARE

## 2024-03-04 PROCEDURE — 97140 MANUAL THERAPY 1/> REGIONS: CPT

## 2024-03-04 PROCEDURE — 97110 THERAPEUTIC EXERCISES: CPT

## 2024-03-04 ASSESSMENT — PAIN SCALES - GENERAL: PAINLEVEL_OUTOF10: 8

## 2024-03-04 ASSESSMENT — PAIN DESCRIPTION - LOCATION: LOCATION: KNEE

## 2024-03-04 ASSESSMENT — PAIN DESCRIPTION - PAIN TYPE: TYPE: SURGICAL PAIN

## 2024-03-04 ASSESSMENT — PAIN DESCRIPTION - ORIENTATION: ORIENTATION: LEFT

## 2024-03-04 NOTE — PROGRESS NOTES
Physical Therapy  Daily Treatment Note  Date: 3/4/2024  Patient Name: Meme Bar  MRN: [id number]     :   1950    Referring Physician: John Paul Foss PA Dr George/ John Paul Foss   PCP: Carlos Reid DO    Medical Diagnosis: No admission diagnoses are documented for this encounter.    Treatment Diagnosis: L TKA with difficulty walking and L knee pain ( Dr Pollard 24)      Insurance: Payor: [facility] MEDICARE / Plan: [facility] MEDICARE ADVANTAGE / Product Type: *No Product type* /   Insurance ID: [id number] - (Medicare Managed)    Subjective:   General  Referring Provider (secondary): Dr Pollard/ John Paul Foss  PT Visit Information  Onset Date: 24  Total # of Visits Approved: 12  Total # of Visits to Date: 5  Plan of Care/Certification Expiration Date: 24  No Show: 0  Canceled Appointment: 0  Referring Provider (secondary): Dr Pollard/ John Paul Foss  Subjective  Subjective: Pt .arrives in 8/10 pain.  Difficulty sleeping at night is using ice, TENS, oxycodone/Tyelonol for pain reduction.  Pain Screening  Patient Currently in Pain: Yes  Pain Assessment: 0-10  Pain Level: 8  Pain Type: Surgical pain  Pain Location: Knee  Pain Orientation: Left       Treatment Activities:  Exercises:      Treatment Reasoning    Exercise 8: Recumbant bike x 10 min (pt able to do full revoltion in reverse)  Exercise 10: seated heel slide with scooting to edge of seat; H5'' x 5  Exercise 11: supine HS stretch and IT band stretch 20- 30 sec x 3 ea  Exercise 12: L QS x 10 hold 5 sec  Exercise 13: supine butterfly stretch hold 20-30 sec x 2  Exercise 14: attempted supine hip flexor stretch too painful and guarded deferred    Limitations addressed: Mobility, Strength, Flexibility, Pain modulation, Posture, Activity tolerance                     Manual Therapy: (CPT 69666) Treatment Reasoning     Joint Mobilization: Seated left knee distraction and AP mobs to increase left knee flexion and then patellar mobs

## 2024-03-07 ENCOUNTER — HOSPITAL ENCOUNTER (OUTPATIENT)
Dept: PHYSICAL THERAPY | Age: 74
Setting detail: THERAPIES SERIES
Discharge: HOME | End: 2024-03-07
Payer: MEDICARE

## 2024-03-07 PROCEDURE — 97110 THERAPEUTIC EXERCISES: CPT

## 2024-03-07 PROCEDURE — 97140 MANUAL THERAPY 1/> REGIONS: CPT

## 2024-03-07 ASSESSMENT — PAIN SCALES - GENERAL: PAINLEVEL_OUTOF10: 3

## 2024-03-07 NOTE — PROGRESS NOTES
Physical Therapy  Daily Treatment Note  Date: 3/7/2024  Patient Name: Meme Bar  MRN: [id number]     :   1950    Referring Physician: John Paul Foss PA Dr George/ John Paul Foss   PCP: Carlos Reid DO    Medical Diagnosis: No admission diagnoses are documented for this encounter.    Treatment Diagnosis: L TKA with difficulty walking and L knee pain ( Dr Pollard 24)      Insurance: Payor: [facility] MEDICARE / Plan: [facility] MEDICARE ADVANTAGE / Product Type: *No Product type* /   Insurance ID: [id number] - (Medicare Managed)    Subjective:   General  Referring Provider (secondary): Dr Pollard/ John Paul Foss  PT Visit Information  Onset Date: 24  Total # of Visits Approved: 12  Total # of Visits to Date: 6  No Show: 0  Canceled Appointment: 0  Referring Provider (secondary): Dr Pollard/ John Paul Foss  Subjective  Subjective: Pt. reports she is still having difficulty sleeping but taking her pain medication more to get better sleep.  Pt. reports she feels very stiff this morning.  Pain Screening  Patient Currently in Pain: Yes  Pain Assessment: 0-10  Pain Level: 3       Treatment Activities:  Exercises:      Treatment Reasoning    Exercise 1: heelsides left with sheet 10 hold x 5 reps  Exercise 2: Held SLR as inc pain groin KT helping helping  Exercise 3: Supported standing B hip ABD x 10 hold 2 sec  Exercise 4: LAQ x 10 hold 3 sec  Exercise 5: Minisquats 2 x10 hold 3 sec  Exercise 6: LAQ x 20 hold 3-5 sec B  Exercise 7: Seated HS curl BTB x 20 hold 3 sec                          Manual Therapy: (CPT 64339) Treatment Reasoning     Joint Mobilization: Seated left knee distraction and AP mobs to increase left knee flexion and then patellar mobs superior/inferior/lateral/medial to dec pain and tightness  Soft Tissue Mobilizaton: PROM to L knee flexion and ext to inc ROM                     AROM L knee in supine 2-105 deg   Assessment:   Conditions Requiring Skilled Therapeutic Intervention  Body

## 2024-03-11 ENCOUNTER — HOSPITAL ENCOUNTER (OUTPATIENT)
Dept: PHYSICAL THERAPY | Age: 74
Setting detail: THERAPIES SERIES
Discharge: HOME | End: 2024-03-11
Payer: MEDICARE

## 2024-03-11 PROCEDURE — 97116 GAIT TRAINING THERAPY: CPT

## 2024-03-11 PROCEDURE — 97110 THERAPEUTIC EXERCISES: CPT

## 2024-03-11 PROCEDURE — 97140 MANUAL THERAPY 1/> REGIONS: CPT

## 2024-03-11 ASSESSMENT — PAIN DESCRIPTION - ORIENTATION: ORIENTATION: LEFT

## 2024-03-11 ASSESSMENT — PAIN SCALES - GENERAL: PAINLEVEL_OUTOF10: 3

## 2024-03-11 ASSESSMENT — PAIN DESCRIPTION - LOCATION: LOCATION: KNEE

## 2024-03-11 ASSESSMENT — PAIN DESCRIPTION - DESCRIPTORS: DESCRIPTORS: ACHING

## 2024-03-11 ASSESSMENT — PAIN DESCRIPTION - PAIN TYPE: TYPE: SURGICAL PAIN

## 2024-03-11 NOTE — PROGRESS NOTES
Physical Therapy  Daily Treatment Note  Date: 3/11/2024  Patient Name: Meme Bar  MRN: [id number]     :   1950    Referring Physician: John Paul Foss PA Dr George/ John Paul Foss   PCP: Carlos Reid DO    Medical Diagnosis: No admission diagnoses are documented for this encounter.    Treatment Diagnosis: L TKA with difficulty walking and L knee pain ( Dr Pollard 24)      Insurance: Payor: [facility] MEDICARE / Plan: [facility] MEDICARE ADVANTAGE / Product Type: *No Product type* /   Insurance ID: [id number] - (Medicare Managed)    Subjective:   General  Referring Provider (secondary): Dr Pollard/ John Paul Foss  PT Visit Information  Onset Date: 24  Total # of Visits Approved: 12  Total # of Visits to Date: 7  Plan of Care/Certification Expiration Date: 24  No Show: 0  Canceled Appointment: 0  Referring Provider (secondary): Dr Pollard/ John Paul Foss  Subjective  Subjective: Pt. states pain level 3/10.  Pain Screening  Patient Currently in Pain: Yes  Pain Assessment: 0-10  Pain Level: 3  Pain Type: Surgical pain  Pain Location: Knee  Pain Orientation: Left  Pain Descriptors: Aching       Treatment Activities:  Exercises:      Treatment Reasoning    Exercise 1: heelsides left with sheet 10 hold x 5 reps  Exercise 8: Recumbant bike x 5 min (pt able to do full revoltion in reverse)  Exercise 9: standing marches x 15 BLE hold 3 sec  Exercise 10: seated heel slide with scooting to edge of seat; H10'' x 10  Exercise 11: standing hip ext x 10 hold 5 sec alternating  Exercise 12: L QS x 10 hold 5 sec  Exercise 13: standing hip abd x 10 hold 5 sec alternating  Exercise 15: Mini squats x 15  Exercise 16: SLR with QS x 10    Limitations addressed: Mobility, Strength, Flexibility, Pain modulation, Posture, Activity tolerance                     Gait: (CPT 94584)  Treatment Reasoning    Gait Training 1: Pt. ambulated 440' x 1 with SPC demoing some circumduction L LE with swing.  Pt. needing cues for inc hip

## 2024-03-13 ENCOUNTER — HOSPITAL ENCOUNTER (OUTPATIENT)
Dept: GENERAL RADIOLOGY | Age: 74
Discharge: HOME | End: 2024-03-15
Payer: MEDICARE

## 2024-03-13 ENCOUNTER — OFFICE VISIT (OUTPATIENT)
Dept: ORTHOPEDIC SURGERY | Age: 74
End: 2024-03-13

## 2024-03-13 VITALS
BODY MASS INDEX: 39.34 KG/M2 | TEMPERATURE: 97.7 F | WEIGHT: 222 LBS | HEIGHT: 63 IN | OXYGEN SATURATION: 97 % | HEART RATE: 80 BPM

## 2024-03-13 DIAGNOSIS — Z96.652 STATUS POST TOTAL LEFT KNEE REPLACEMENT: ICD-10-CM

## 2024-03-13 DIAGNOSIS — Z96.652 STATUS POST TOTAL LEFT KNEE REPLACEMENT: Primary | ICD-10-CM

## 2024-03-13 PROCEDURE — 99024 POSTOP FOLLOW-UP VISIT: CPT | Performed by: PHYSICIAN ASSISTANT

## 2024-03-13 PROCEDURE — 73560 X-RAY EXAM OF KNEE 1 OR 2: CPT

## 2024-03-13 NOTE — PROGRESS NOTES
strength: improving    Imaging: Performed today or February 14, 2024  1. Implants are well aligned.  Implants are well fixed.  Patella is well positioned.  X-rays performed today, single view merchant, no lateral tilt to either patella  Provider:   BROOKE French

## 2024-03-14 ENCOUNTER — HOSPITAL ENCOUNTER (OUTPATIENT)
Dept: PHYSICAL THERAPY | Age: 74
Setting detail: THERAPIES SERIES
Discharge: HOME | End: 2024-03-14
Payer: MEDICARE

## 2024-03-14 PROCEDURE — 97110 THERAPEUTIC EXERCISES: CPT

## 2024-03-14 PROCEDURE — 97140 MANUAL THERAPY 1/> REGIONS: CPT

## 2024-03-14 ASSESSMENT — PAIN SCALES - GENERAL: PAINLEVEL_OUTOF10: 4

## 2024-03-14 NOTE — PROGRESS NOTES
Physical Therapy: Daily Note   Patient: Meme Bar (73 y.o. female)   Examination Date: 2024  Plan of Care/Certification Expiration Date: 24    Progress Note Counter: DC to HEP 10/12/23     :  1950 # of Visits since SOC:   9   MRN: [id number]  CSN: [id number] Start of Care Date:   2024   Insurance: Payor: [facility] MEDICARE / Plan: [facility] MEDICARE ADVANTAGE / Product Type: *No Product type* /   Insurance ID: [id number] - (Medicare Managed) Secondary Insurance (if applicable):    Referring Physician: John Paul Foss PA Dr George/ John Paul Foss   PCP: Carlos Reid DO Visits to Date/Visits Approved:     No Show/Cancelled Appts: 0 / 0     Medical Diagnosis: No admission diagnoses are documented for this encounter.    Treatment Diagnosis: L TKA with difficulty walking and L knee pain ( Dr Pollard 24)        SUBJECTIVE EXAMINATION   Pain Level: Pain Screening  Patient Currently in Pain: Yes  Pain Assessment: 0-10  Pain Level: 4 (L knee)    Patient Comments: Subjective: Pt states she had F/U with doctor yesterday who was pleased with her progress. Pt states her pain level currently is a 4/10 after 2 Tylenol.    HEP Compliance: Good        OBJECTIVE EXAMINATION   Restrictions:  Restrictions/Precautions: Fall Risk   Required Braces or Orthoses?: No   Implants present? : Metal implants (old R TKA, new L TKA)            Left AROM  Right AROM      AROM LLE (degrees)  L Knee Flexion (0-145): 1-107 deg         TREATMENT     Exercises:      Treatment Reasoning    Exercise 1: heelsides left with sheet 10 hold x 5 reps  Exercise 8: Recumbant bike x 5 min (pt able to do full revoltion in reverse)  Exercise 10: seated heel slide with scooting to edge of seat; H10'' x 10  Exercise 17: SAQ; 3# x 15 H3''  Exercise 18: standing gastroc stretch; H30'' x 3  Exercise 19: standing heel/toe raises; x 10 H3''  Exercise 20: standing hip extension; H3'' x 10                          Manual Therapy: (CPT 45564)

## 2024-03-18 ENCOUNTER — HOSPITAL ENCOUNTER (OUTPATIENT)
Dept: PHYSICAL THERAPY | Age: 74
Setting detail: THERAPIES SERIES
Discharge: HOME | End: 2024-03-18
Payer: MEDICARE

## 2024-03-18 PROCEDURE — 97110 THERAPEUTIC EXERCISES: CPT

## 2024-03-18 PROCEDURE — 97140 MANUAL THERAPY 1/> REGIONS: CPT

## 2024-03-18 PROCEDURE — 97116 GAIT TRAINING THERAPY: CPT

## 2024-03-18 ASSESSMENT — PAIN SCALES - GENERAL
PAINLEVEL_OUTOF10: 3
PAINLEVEL_OUTOF10: 3

## 2024-03-18 ASSESSMENT — PAIN DESCRIPTION - PAIN TYPE: TYPE: SURGICAL PAIN

## 2024-03-18 ASSESSMENT — PAIN DESCRIPTION - LOCATION: LOCATION: KNEE

## 2024-03-18 ASSESSMENT — PAIN DESCRIPTION - ORIENTATION: ORIENTATION: LEFT

## 2024-03-18 ASSESSMENT — PAIN DESCRIPTION - DESCRIPTORS: DESCRIPTORS: ACHING

## 2024-03-18 NOTE — PROGRESS NOTES
Physical Therapy  Daily Treatment Note  Date: 3/18/2024  Patient Name: Meme Bar  MRN: [id number]     :   1950    Referring Physician: John Paul Foss PA Dr George/ John Paul Foss   PCP: Carlos Reid DO    Medical Diagnosis: No admission diagnoses are documented for this encounter.    Treatment Diagnosis: L TKA with difficulty walking and L knee pain ( Dr Pollard 24)      Insurance: Payor: [facility] MEDICARE / Plan: [facility] MEDICARE ADVANTAGE / Product Type: *No Product type* /   Insurance ID: [id number] - (Medicare Managed)    Subjective:   General  Referring Provider (secondary): Dr Pollard/ John Paul Foss  PT Visit Information  Onset Date: 24  Total # of Visits Approved: 12  Total # of Visits to Date: 9  Plan of Care/Certification Expiration Date: 24  No Show: 0  Canceled Appointment: 0  Referring Provider (secondary): Dr Pollard/ John Paul Foss  Subjective  Subjective: Pt. states she has inc tenderness in L knee.  Reports walking with cane 90% of the time.  Pain Screening  Patient Currently in Pain: Yes  Pain Assessment: 0-10  Pain Level: 3  Pain Type: Surgical pain  Pain Location: Knee  Pain Orientation: Left  Pain Descriptors: Aching       Treatment Activities:  Exercises:      Treatment Reasoning    Exercise 1: heelsides left with sheet 10 hold x 5 reps  Exercise 2: SLR x 12 hold 3 sec  Exercise 3: VMO SLR x 5  Exercise 4: Bridge x 5 with adduction  Exercise 5: Standing hip ABD x 10 hold 3-5 sec  Exercise 6: SAQ x 20 hold 3 sec alternating  Exercise 8: Recumbant bike x 5 min (pt able to do full revoltion in reverse)  Exercise 15: Mini squats x 15  Exercise 18: standing gastroc stretch; H30'' x 3    Limitations addressed: Mobility, Strength, Flexibility, Pain modulation, Posture, Activity tolerance                     Gait: (CPT 32893)  Treatment Reasoning    Gait Training 1: Pt. ambulated 440' x 1 with SPC focusing on heel toe pattern.  Less circumduction noted.

## 2024-03-22 ENCOUNTER — HOSPITAL ENCOUNTER (OUTPATIENT)
Dept: PHYSICAL THERAPY | Age: 74
Setting detail: THERAPIES SERIES
Discharge: HOME | End: 2024-03-22
Payer: MEDICARE

## 2024-03-22 PROCEDURE — 97140 MANUAL THERAPY 1/> REGIONS: CPT

## 2024-03-22 PROCEDURE — 97530 THERAPEUTIC ACTIVITIES: CPT

## 2024-03-22 NOTE — PROGRESS NOTES
Physical Therapy: Recheck Note   Patient: Meme Bar (73 y.o. female)   Examination Date: 2024  Plan of Care/Certification Expiration Date: 24    Progress Note Counter: Recheck completed on 3/22/24 with plans to continue with PT for 1-2x per week for 4-6 weeks decreasing pt to 1x per week     :  1950 # of Visits since SOC:   11   MRN: [id number]  CSN: [id number] Start of Care Date:   2024   Insurance: Payor: [facility] MEDICARE / Plan: [facility] MEDICARE ADVANTAGE / Product Type: *No Product type* /   Insurance ID: [id number] - (Medicare Managed) Secondary Insurance (if applicable):    Referring Physician: John Paul Foss PA Dr George/ John Paul Foss   PCP: Carlos Reid DO Visits to Date/Visits Approved:     No Show/Cancelled Appts: 0 / 0     Medical Diagnosis: No admission diagnoses are documented for this encounter.    Treatment Diagnosis: L TKA with difficulty walking and L knee pain ( Dr Pollard 24)        SUBJECTIVE EXAMINATION   Pain Level:      Patient Comments: Subjective: Pt reports having 2/10 achy pain left knee    HEP Compliance: Good        OBJECTIVE EXAMINATION   Restrictions:  Restrictions/Precautions: Fall Risk   Required Braces or Orthoses?: No   Implants present? : Metal implants (old R TKA, new L TKA)           Left AROM  Left PROM         AROM LLE (degrees)  L Knee Flexion (0-145): In supine post manual   3-110 with AAROM            Left Strength  Right Strength         Strength LLE  L Hip Flexion: 4/5  L Hip Extension: 4/5  L Hip ABduction: 4/5  L Hip ADduction: 4/5  L Knee Flexion: 4/5  L Knee Extension: 4/5  L Ankle Dorsiflexion: 4+/5  L Ankle Plantar Flexion: 4+/5            TREATMENT     Gait: (CPT 83513)  Treatment Reasoning    Gait Training 1: Pt ambulated with st cane for 6 minuties for 1030'. Pt ambulated with fairly equal step length but decreased step length L LE once fatigued after her first lap of 440'x1, Pt reports pain as 2-3/10 post gait after 6 min

## 2024-03-25 ENCOUNTER — HOSPITAL ENCOUNTER (OUTPATIENT)
Dept: PHYSICAL THERAPY | Age: 74
Setting detail: THERAPIES SERIES
Discharge: HOME | End: 2024-03-25
Payer: MEDICARE

## 2024-03-25 PROCEDURE — 97110 THERAPEUTIC EXERCISES: CPT

## 2024-03-25 PROCEDURE — 97140 MANUAL THERAPY 1/> REGIONS: CPT

## 2024-03-25 ASSESSMENT — PAIN SCALES - GENERAL: PAINLEVEL_OUTOF10: 4

## 2024-03-25 NOTE — PROGRESS NOTES
Physical Therapy: Daily Note   Patient: Meme Bar (73 y.o. female)   Examination Date: 2024  Plan of Care/Certification Expiration Date: 24    Progress Note Counter: Recheck completed on 3/22/24 with plans to continue with PT for 1-2x per week for 4-6 weeks decreasing pt to 1x per week     :  1950 # of Visits since SOC:   12   MRN: [id number]  CSN: [id number] Start of Care Date:   2024   Insurance: Payor: [facility] MEDICARE / Plan: [facility] MEDICARE ADVANTAGE / Product Type: *No Product type* /   Insurance ID: [id number] - (Medicare Managed) Secondary Insurance (if applicable):    Referring Physician: John Paul Foss PA Dr George/ John Paul Foss   PCP: Carlos Reid DO Visits to Date/Visits Approved:     No Show/Cancelled Appts: 0 / 0     Medical Diagnosis: No admission diagnoses are documented for this encounter.    Treatment Diagnosis: L TKA with difficulty walking and L knee pain ( Dr Pollard 24)        SUBJECTIVE EXAMINATION   Pain Level: Pain Screening  Patient Currently in Pain: Yes  Pain Assessment: 0-10  Pain Level: 4    Patient Comments: Subjective: Pt states 4/10 L knee pain. Pt has been doing HEP daily.    HEP Compliance: Good        OBJECTIVE EXAMINATION   Restrictions:  Restrictions/Precautions: Fall Risk   Required Braces or Orthoses?: No   Implants present? : Metal implants (old R TKA, new L TKA)            Left AROM  Right AROM      AROM LLE (degrees)  L Knee Flexion (0-145): In supine post manual   3-110 with AAROM         TREATMENT     Exercises:      Treatment Reasoning    Exercise 15: Mini squats x 10 H3''  Exercise 20: standing hip extension; H3'' x 10                          Manual Therapy: (CPT 09059) Treatment Reasoning     Joint Mobilization: Seated left knee distraction and AP mobs to increase left knee flexion as well as overpressure with knee in full extension to increase both knee flexion and extension  Manual Traction: PROM to L knee to inc ROM  Soft

## 2024-03-28 ENCOUNTER — HOSPITAL ENCOUNTER (OUTPATIENT)
Dept: PHYSICAL THERAPY | Age: 74
Setting detail: THERAPIES SERIES
Discharge: HOME | End: 2024-03-28
Payer: MEDICARE

## 2024-03-28 PROCEDURE — 97140 MANUAL THERAPY 1/> REGIONS: CPT

## 2024-03-28 PROCEDURE — 97530 THERAPEUTIC ACTIVITIES: CPT

## 2024-03-28 PROCEDURE — 97110 THERAPEUTIC EXERCISES: CPT

## 2024-03-28 ASSESSMENT — PAIN SCALES - GENERAL: PAINLEVEL_OUTOF10: 4

## 2024-03-28 NOTE — PROGRESS NOTES
knee flexion as well as overpressure with knee in full extension to increase both knee flexion and extension  Other: KT tape I strip horizontal across patella 50% tension to promote neutral patella. I strip supieror medial pull across patella 50% tension for neutral patella and to dec pain.                      Pt Education: Additional Comments: KT tape       ASSESSMENT     Assessment: Assessment: Pt nearly meeting L knee ROM goal post manual, self stretching and KT tape application, 2-111 deg AROM. Pt educated on stretching hamstrings and IT band to further dec pain. Educated pt on lying sidelying with pillows between knees and ankles for inc comfort and dec pull on knee. Pt verbalizing understanding. Pt overall with dec in pain to 1/10 post tx. Continue with POC.  Body Structures, Functions, Activity Limitations Requiring Skilled Therapeutic Intervention: Decreased functional mobility , Decreased ROM, Decreased strength, Decreased endurance, Decreased balance, Decreased safe awareness, Decreased posture, Increased pain    Post-Treatment Pain Level:      Activity Tolerance: Patient limited by pain    Therapy Prognosis: Good       GOALS   Patient Goals : \"I  need to be able to walk without this walker and not have so much pain in  my left knee. \"  Short Term Goals Completed by   Current Status Goal Status   Pt reporting HEP at least once a day 5/7 days to gain L knee ROM and strength for functioanl mobility Pt repots 5x per week Met   5 sit to stand to sit in <= 12 seconds Recheck sit to stand x 5= 11.87 seconds Met   Pt able to complete one lap = 440 ft in <= 6 minutes to inc endurance and functional walking mobility Pt ambulated with st cane for 6 minuties for 1030'. Pt ambulated with fairly equal step length but decreased step length L LE once fatigued after her first lap of 440'x1, Pt reports pain as 2-3/10 post gait after 6 min walk test. Met   Pt reporting any decrease in L knee pain 3/22/24 On recheck 4/10

## 2024-04-02 ENCOUNTER — APPOINTMENT (OUTPATIENT)
Dept: PHYSICAL THERAPY | Age: 74
End: 2024-04-02
Payer: MEDICARE

## 2024-04-03 ENCOUNTER — APPOINTMENT (OUTPATIENT)
Dept: PHYSICAL THERAPY | Age: 74
End: 2024-04-03
Payer: MEDICARE

## 2024-04-04 ENCOUNTER — HOSPITAL ENCOUNTER (OUTPATIENT)
Dept: PHYSICAL THERAPY | Age: 74
Setting detail: THERAPIES SERIES
Discharge: HOME | End: 2024-04-04
Payer: MEDICARE

## 2024-04-04 PROCEDURE — 97140 MANUAL THERAPY 1/> REGIONS: CPT

## 2024-04-04 PROCEDURE — 97110 THERAPEUTIC EXERCISES: CPT

## 2024-04-04 NOTE — PROGRESS NOTES
Physical Therapy  Daily Treatment Note  Date: 2024  Patient Name: Meme Bar  MRN: [id number]     :   1950    Referring Physician: John Paul Foss PA Dr George/ John Paul Foss   PCP: Carlos Reid DO    Medical Diagnosis: No admission diagnoses are documented for this encounter.    Treatment Diagnosis: L TKA with difficulty walking and L knee pain ( Dr Pollard 24)      Insurance: Payor: [facility] MEDICARE / Plan: [facility] MEDICARE ADVANTAGE / Product Type: *No Product type* /   Insurance ID: [id number] - (Medicare Managed)    Subjective:   General  Referring Provider (secondary): Dr Pollard/ John Paul Foss  PT Visit Information  Onset Date: 24  Total # of Visits Approved: 18  Total # of Visits to Date: 14  Plan of Care/Certification Expiration Date: 24  No Show: 0  Canceled Appointment: 0  Progress Note Counter: Recheck completed on 3/22/24 with plans to continue with PT for 1-2x per week for 4-6 weeks decreasing pt to 1x per week  Referring Provider (secondary): Dr Pollard/ John Paul Foss       Treatment Activities:  Exercises:      Treatment Reasoning    Exercise 1: heelsides left with sheet 30 sec hold x 5 reps  Exercise 2: SLR x 12 hold 3 sec  Exercise 5: Sidelying hip ABD x 10 hold 3-5 sec  Exercise 6: Squats at bar x 10 hold 3 sec  Exercise 8: Recumbant bike x 5 min (pt able to do full revoltion in reverse and forward with inc effort)                          Manual Therapy: (CPT 54921) Treatment Reasoning     Joint Mobilization: Seated left knee distraction and AP mobs to increase left knee flexion as well as overpressure with knee in full extension to increase both knee flexion and extension  Soft Tissue Mobilizaton: PROM to L knee flexion and ext to inc ROM  Other: KT tape I strip horizontal across patella 50% tension to promote neutral patella. I strip supieror medial pull across patella 50% tension for neutral patella and to dec pain. Limitations addressed: Joint motion, Edema,

## 2024-04-09 ENCOUNTER — HOSPITAL ENCOUNTER (OUTPATIENT)
Dept: PHYSICAL THERAPY | Age: 74
Setting detail: THERAPIES SERIES
Discharge: HOME | End: 2024-04-09
Payer: MEDICARE

## 2024-04-09 PROCEDURE — 97140 MANUAL THERAPY 1/> REGIONS: CPT

## 2024-04-09 PROCEDURE — 97530 THERAPEUTIC ACTIVITIES: CPT

## 2024-04-09 PROCEDURE — 97110 THERAPEUTIC EXERCISES: CPT

## 2024-04-09 NOTE — PROGRESS NOTES
Physical Therapy  Physical Therapy: Daily Note   Patient: Meme Bar (73 y.o. female)   Examination Date: 2024  Plan of Care/Certification Expiration Date: 24    Progress Note Counter: Recheck completed on 3/22/24 with plans to continue with PT for 1-2x per week for 4-6 weeks decreasing pt to 1x per week     :  1950 # of Visits since SOC:   15   MRN: [id number]  CSN: [id number] Start of Care Date:   2024   Insurance: Payor: [facility] MEDICARE / Plan: [facility] MEDICARE ADVANTAGE / Product Type: *No Product type* /   Insurance ID: [id number] - (Medicare Managed) Secondary Insurance (if applicable):    Referring Physician: John Paul Foss PA Dr George/ Jhon Paul Foss   PCP: Carlos Reid DO Visits to Date/Visits Approved:     No Show/Cancelled Appts: 0 / 0     Medical Diagnosis: No admission diagnoses are documented for this encounter. L knee OA post L TKA Dr Pollard  with difficulty walking  Treatment Diagnosis: L TKA with difficulty walking and L knee pain ( Dr Pollard 24)        SUBJECTIVE EXAMINATION   Pain Level:      Patient Comments: Subjective: I had x rays done with left knee. They say knee looks okay. I still hurt at the bottom and slightly outside of knee.    HEP Compliance: Fair  Any changes to allergies, medications, or have you had any medical procedures/ER visits since your last visit?: No     OBJECTIVE EXAMINATION   Restrictions:  Restrictions/Precautions: Fall Risk   Required Braces or Orthoses?: No   Implants present? : Metal implants (old R TKA, new L TKA)        Left AROM  Right AROM      AROM LLE (degrees)  L Knee Flexion (0-145): post manual 10 - 103 deg supine and less ther ex reps this date.             TREATMENT     Exercises:      Treatment Reasoning    Exercise 1: heelsides left with AAROM 30 sec hold x 5 reps  Exercise 8: Recumbant bike x 5 min (pt able to do full revoltion in reverse and forward with inc effort) inconsistent ROM demo'd.  Exercise 12: L

## 2024-04-12 ENCOUNTER — HOSPITAL ENCOUNTER (OUTPATIENT)
Dept: PHYSICAL THERAPY | Age: 74
Setting detail: THERAPIES SERIES
Discharge: HOME | End: 2024-04-12
Payer: MEDICARE

## 2024-04-12 PROCEDURE — 97110 THERAPEUTIC EXERCISES: CPT

## 2024-04-12 PROCEDURE — 97140 MANUAL THERAPY 1/> REGIONS: CPT

## 2024-04-12 NOTE — PROGRESS NOTES
knee AROM 3-105 deg post LUNA and manual therapy 110 deg   Assessment:   Conditions Requiring Skilled Therapeutic Intervention  Body Structures, Functions, Activity Limitations Requiring Skilled Therapeutic Intervention: Decreased functional mobility ;Decreased ROM;Decreased strength;Decreased endurance;Decreased balance;Decreased safe awareness;Decreased posture;Increased pain  Assessment: Pt. was able to acheive 3-110 deg AAROM L knee with focus on manual therapy and LUNA. Advancing with step ups using 6\" demoing good form but easily fatigues.  Added Heel taps as well to help with stability.  Mild soreness post.  Pt. to ice at home.  Treatment Diagnosis: L TKA with difficulty walking and L knee pain ( Dr Pollard 1/22/24)          Goals:   Patient Goals : \"I  need to be able to walk without this walker and not have so much pain in  my left knee. \"  Short Term Goals Completed by   Current Status Goal Status   Pt reporting HEP at least once a day 5/7 days to gain L knee ROM and strength for functioanl mobility Pt repots 5x per week Met   5 sit to stand to sit in <= 12 seconds Recheck sit to stand x 5= 11.87 seconds Met   Pt able to complete one lap = 440 ft in <= 6 minutes to inc endurance and functional walking mobility Pt ambulated with st cane for 6 minuties for 1030'. Pt ambulated with fairly equal step length but decreased step length L LE once fatigued after her first lap of 440'x1, Pt reports pain as 2-3/10 post gait after 6 min walk test. Met   Pt reporting any decrease in L knee pain 3/22/24 On recheck 4/10 most of her day In progress   Decrease edema in L knee any amount for better functioanl mobility L knee edema - 10 cm above jt line 68 cm, L knee jt line 58 cm, 10 cm below L knee jt line 52 cm    inc AAROM L knee to >= 9-100 deg 3/22/24 L knee AAROM 3-110 post manual                              Long Term Goals Completed by 4-6  weeks or 18 visits after recheck Current Status Goal Status   Increase L knee

## 2024-04-15 ENCOUNTER — HOSPITAL ENCOUNTER (OUTPATIENT)
Dept: PHYSICAL THERAPY | Age: 74
Setting detail: THERAPIES SERIES
Discharge: HOME | End: 2024-04-15
Payer: MEDICARE

## 2024-04-15 PROCEDURE — 97116 GAIT TRAINING THERAPY: CPT

## 2024-04-15 PROCEDURE — 97140 MANUAL THERAPY 1/> REGIONS: CPT

## 2024-04-15 PROCEDURE — 97110 THERAPEUTIC EXERCISES: CPT

## 2024-04-15 PROCEDURE — 97530 THERAPEUTIC ACTIVITIES: CPT

## 2024-04-15 NOTE — PROGRESS NOTES
Physical Therapy  Physical Therapy: Daily Note   Patient: Meme Bar (73 y.o. female)   Examination Date: 04/15/2024  Plan of Care/Certification Expiration Date: 24    Progress Note Counter: Recheck completed on 3/22/24 with plans to continue with PT for 1-2x per week for 4-6 weeks decreasing pt to 1x per week     :  1950 # of Visits since SOC:   17   MRN: [id number]  CSN: [id number] Start of Care Date:   2024   Insurance: Payor: [facility] MEDICARE / Plan: [facility] MEDICARE ADVANTAGE / Product Type: *No Product type* /   Insurance ID: [id number] - (Medicare Managed) Secondary Insurance (if applicable):    Referring Physician: John Paul Foss PA Dr George/ John Paul Foss   PCP: Carlos Reid DO Visits to Date/Visits Approved: 15 / 18    No Show/Cancelled Appts: 0 / 0     Medical Diagnosis: No admission diagnoses are documented for this encounter. L knee OA post L TKA Dr Pollard  with difficulty walking  Treatment Diagnosis: L TKA with difficulty walking and L knee pain ( Dr Pollard 24)        SUBJECTIVE EXAMINATION   Pain Level:      Patient Comments: Subjective: Left knee hurts 5/10. The R knee wants to give out and hurts 3/10.    HEP Compliance: Fair        OBJECTIVE EXAMINATION   Restrictions:  Restrictions/Precautions: Fall Risk   Required Braces or Orthoses?: No   Implants present? : Metal implants (old R TKA, new L TKA)        Left AROM  Right AROM      AROM LLE (degrees)  L Knee Flexion (0-145): 106 deg seated post LUNA and manual  L Knee Extension (0): lacking 8 degrees from neutral seated.             TREATMENT     Exercises:      Treatment Reasoning    Exercise 1: heelsides left with AAROM 30 sec hold x 5 reps  Exercise 8: Recumbant bike x 5 min (pt able to do full revoltion in reverse and forward with inc effort)  Exercise 12: L QS x 10 hold 5 sec, tactile and vc's. Manual cues to assist with knee extension form. Manual over pressure x 3 with 5-10 second hold post QS.  Exercise 15:

## 2024-04-18 ENCOUNTER — HOSPITAL ENCOUNTER (OUTPATIENT)
Dept: PHYSICAL THERAPY | Age: 74
Setting detail: THERAPIES SERIES
Discharge: HOME | End: 2024-04-18
Payer: MEDICARE

## 2024-04-18 PROCEDURE — 97140 MANUAL THERAPY 1/> REGIONS: CPT

## 2024-04-18 PROCEDURE — 97110 THERAPEUTIC EXERCISES: CPT

## 2024-04-18 NOTE — PROGRESS NOTES
Physical Therapy  Physical Therapy: Daily Note   Patient: Meme Bar (73 y.o. female)   Examination Date: 2024  Plan of Care/Certification Expiration Date: 24    Progress Note Counter: Recheck completed on 3/22/24 with plans to continue with PT for 1-2x per week for 4-6 weeks decreasing pt to 1x per week     :  1950 # of Visits since SOC:   18   MRN: [id number]  CSN: [id number] Start of Care Date:   2024   Insurance: Payor: [facility] MEDICARE / Plan: [facility] MEDICARE ADVANTAGE / Product Type: *No Product type* /   Insurance ID: [id number] - (Medicare Managed) Secondary Insurance (if applicable):    Referring Physician: John Paul Foss PA Dr George/ John Paul Foss   PCP: Carlos Reid DO Visits to Date/Visits Approved:     No Show/Cancelled Appts: 0 / 0     Medical Diagnosis: No admission diagnoses are documented for this encounter. L knee OA post L TKA Dr Pollard  with difficulty walking  Treatment Diagnosis: L TKA with difficulty walking and L knee pain ( Dr Pollard 24)        SUBJECTIVE EXAMINATION   Pain Level:      Patient Comments: Subjective: Left knee is tight 5/10. I want to know what I can do to make my balance better such as feeding the birds reaching up to fill the feeders.    HEP Compliance: Fair        OBJECTIVE EXAMINATION   Restrictions:  Restrictions/Precautions: Fall Risk   Required Braces or Orthoses?: No   Implants present? : Metal implants (old R TKA, new L TKA)            Left PROM  Right PROM      AAROM LLE (degrees)  L Knee Flexion (0-145): Supine 8 -106 AAROM         TREATMENT     Exercises:      Treatment Reasoning    Exercise 1: Standing hip flexion x 10' finger tip support  Exercise 2: Standing hip abduction x 10' finger tip support  Exercise 3: Standing hamstring curls x 10' with finger tip support  Exercise 4: Standing hip extension x 10' with finger tip support  Exercise 5: Standing heel raises x 10' finger tip support  Exercise 6: Standing mini

## 2024-04-22 ENCOUNTER — HOSPITAL ENCOUNTER (OUTPATIENT)
Dept: PHYSICAL THERAPY | Age: 74
Setting detail: THERAPIES SERIES
Discharge: HOME | End: 2024-04-22
Payer: MEDICARE

## 2024-04-22 PROCEDURE — 97110 THERAPEUTIC EXERCISES: CPT

## 2024-04-22 PROCEDURE — 97140 MANUAL THERAPY 1/> REGIONS: CPT

## 2024-04-22 PROCEDURE — 97530 THERAPEUTIC ACTIVITIES: CPT

## 2024-04-22 NOTE — PROGRESS NOTES
able to walk without this walker and not have so much pain in  my left knee. \"  Short Term Goals Completed by   Current Status Goal Status   Pt reporting HEP at least once a day 5/7 days to gain L knee ROM and strength for functioanl mobility Pt repots 5x per week Met   5 sit to stand to sit in <= 12 seconds Recheck sit to stand x 5= 11.87 seconds Met   Pt able to complete one lap = 440 ft in <= 6 minutes to inc endurance and functional walking mobility Pt ambulated with st cane for 6 minuties for 1030'. Pt ambulated with fairly equal step length but decreased step length L LE once fatigued after her first lap of 440'x1, Pt reports pain as 2-3/10 post gait after 6 min walk test. Met   Pt reporting any decrease in L knee pain 3/22/24 On recheck 4/10 most of her day In progress   Decrease edema in L knee any amount for better functioanl mobility L knee edema - 10 cm above jt line 68 cm, L knee jt line 58 cm, 10 cm below L knee jt line 52 cm     inc AAROM L knee to >= 9-100 deg 3/22/24 L knee AAROM 3-110 post manual                                         Long Term Goals Completed by 4-6  weeks or 18 visits after recheck Current Status Goal Status   Increase L knee AROM to >= 2- 112 deg 3-110 AAROM post manual In progress   Increase L knee and L hip sternght to >= 4+/5 avialble ROM to ahcieve mboitliy goals Pts strength in improving in all abhi In progress   6 minute walk test >= 880ft with pain post in L knee <= 1-2/10 , <= cane for AD 6 minute walk test >=1030 ft with pain post in L knee <= 2-3/10 , <= cane for AD In progress   5 sit to stand to sit in <= 12 seconds with L knee pain <= 1-2/10 Recheck sit to stand x 5= 11.87 seconds Met   Pt competent advanced HEP for discharge Pt progressing with her HEP In progress                                                TREATMENT PLAN   Plan Frequency: 1-2x week  Plan weeks: 4-6 weeks additional weeks for 18 visits  Current Treatment Recommendations: ROM, Balance training,

## 2024-04-25 ENCOUNTER — HOSPITAL ENCOUNTER (OUTPATIENT)
Dept: PHYSICAL THERAPY | Age: 74
Setting detail: THERAPIES SERIES
Discharge: HOME | End: 2024-04-25
Payer: MEDICARE

## 2024-04-25 PROCEDURE — 97110 THERAPEUTIC EXERCISES: CPT

## 2024-04-25 PROCEDURE — 97530 THERAPEUTIC ACTIVITIES: CPT

## 2024-04-25 ASSESSMENT — PAIN DESCRIPTION - DESCRIPTORS: DESCRIPTORS: ACHING

## 2024-04-25 ASSESSMENT — PAIN SCALES - GENERAL: PAINLEVEL_OUTOF10: 4

## 2024-04-25 ASSESSMENT — PAIN DESCRIPTION - ORIENTATION: ORIENTATION: LEFT

## 2024-04-25 ASSESSMENT — PAIN DESCRIPTION - PAIN TYPE: TYPE: SURGICAL PAIN

## 2024-04-25 ASSESSMENT — PAIN DESCRIPTION - LOCATION: LOCATION: KNEE

## 2024-04-25 NOTE — PROGRESS NOTES
LEFS score: patient did not fill out, unable to score    TREATMENT     Exercises:      Treatment Reasoning        Seated heel slides x 10 with overpressure  Tandem stance: 2 x 20 seconds each LE  Manual overpressure extension stretching  Review of existing standing home exercises  Improve balance, ROM and function                     Therapeutic Activities: (CPT 05987) Treatment Reasoning     Recheck - assessed goals, provided education, updated HEP total of 40 minutes                      Pt Education: PT Education: Goals, PT Role, Plan of Care, Evaluative findings, Home Exercise Program, Posture  Patient Education: d/c to HEP       ASSESSMENT     Assessment: Assessment: Pt moving better and overall ROM is improving as is ability to walk around. She is slightly frustrated as she continues to have some pain and swelling but overall is stronger, better motion and less pain than she had at start of therapy.  Body Structures, Functions, Activity Limitations Requiring Skilled Therapeutic Intervention: Decreased functional mobility , Decreased ROM, Decreased strength, Decreased endurance, Decreased balance, Decreased safe awareness, Decreased posture, Increased pain    Post-Treatment Pain Level:      Activity Tolerance: Patient limited by pain    Therapy Prognosis: Good       GOALS   Patient Goals : \"I  need to be able to walk without this walker and not have so much pain in  my left knee. \"  Short Term Goals Completed by 1-2 weeks Current Status Goal Status   Pt reporting HEP at least once a day 5/7 days to gain L knee ROM and strength for functioanl mobility Pt repots 5x per week Met   5 sit to stand to sit in <= 12 seconds 5x in 11 seconds Met     Pt ambulated with st cane for 6 minuties for 1125'. Pt ambulated with fairly equal step length but decreased step length L LE once fatigued after her first lap of 440'x1, Pt reports pain as 2-3/10 post gait after 6 min walk test. Met   Pt reporting any decrease in L knee

## 2024-04-29 NOTE — PROGRESS NOTES
Narrative Referring Provider:   Barry Pollard Jr      PCP:   Carlos Reid, DO    ============================  IMPRESSION/PLAN:  ============================  Meme MARROQUIN Case is s/p left Total knee replacement completed on 2024.     IMPRESSION: At normal postoperative stage of recovery.  I think that the patient was initially disappointed, she was comparing her recent knee replacement to her other knee replacement, which had a 6-month Headstart.  I explained to her that she is doing very well on the left side, and I think that the outcomes will equalize over time.    PLAN:  No new treatment indicated.  Routine follow-up.    Patient Reassurance: Normal post-operative course discussed with patient.  Patient reassured and supported. All questions answered.    Follow up 2025  X-Rays Needed    Patient presents today for an intermediate post-op visit    Status post op:  left Total knee replacement     BMI: There is no height or weight on file to calculate BMI.    Post-operative recovery was complicated by uneventful/none.    Patient rates their condition as improving.     Does the patient still experience pain? 2/10 pain, aching    Post Op discharge patient location: in home.   Functional Assessment is as follows: Therapy is completed  Functional difficulties: None.  Pain Medication: None  Currently Ambulating with: No assistive devices    =================================  EXAM: POST OP KNEE  =================================  Left Post-Operative Knee    Ambulates with a normal gait.    SKIN: Appropriate postop appearance, No evidence of erythema, warmth, discharge or drainage and Incision clean/dry/intact.    Range of motion is 3 degrees in extension and   110 degrees of flexion.    Extension La degrees    Pain with ROM: Yes with deeper flexion    There is Mild effusion.     Mal-alignment: No    No tenderness to palpation    Neurovascular Status: Sensation Intact, Moves foot and ankle up & down,

## 2024-05-01 ENCOUNTER — OFFICE VISIT (OUTPATIENT)
Dept: ORTHOPEDIC SURGERY | Age: 74
End: 2024-05-01
Payer: MEDICARE

## 2024-05-01 VITALS
WEIGHT: 222 LBS | HEART RATE: 63 BPM | BODY MASS INDEX: 39.34 KG/M2 | HEIGHT: 63 IN | OXYGEN SATURATION: 97 % | TEMPERATURE: 97.7 F

## 2024-05-01 DIAGNOSIS — Z96.652 PRESENCE OF TOTAL KNEE JOINT PROSTHESIS, LEFT: Primary | ICD-10-CM

## 2024-05-01 PROCEDURE — 1123F ACP DISCUSS/DSCN MKR DOCD: CPT | Performed by: ORTHOPAEDIC SURGERY

## 2024-05-01 PROCEDURE — 99213 OFFICE O/P EST LOW 20 MIN: CPT | Performed by: ORTHOPAEDIC SURGERY

## 2024-08-20 RX ORDER — AMOXICILLIN 500 MG/1
CAPSULE ORAL
Qty: 6 CAPSULE | Refills: 0 | Status: SHIPPED | OUTPATIENT
Start: 2024-08-20

## 2024-09-26 RX ORDER — AMOXICILLIN 500 MG/1
CAPSULE ORAL
Qty: 6 CAPSULE | Refills: 0 | Status: SHIPPED | OUTPATIENT
Start: 2024-09-26

## 2024-11-26 ENCOUNTER — OFFICE VISIT (OUTPATIENT)
Dept: PULMONOLOGY | Age: 74
End: 2024-11-26
Payer: MEDICARE

## 2024-11-26 VITALS
DIASTOLIC BLOOD PRESSURE: 68 MMHG | HEART RATE: 92 BPM | HEIGHT: 63 IN | SYSTOLIC BLOOD PRESSURE: 132 MMHG | WEIGHT: 222 LBS | BODY MASS INDEX: 39.34 KG/M2 | OXYGEN SATURATION: 97 % | TEMPERATURE: 97.6 F

## 2024-11-26 DIAGNOSIS — R91.1 LUNG NODULE: ICD-10-CM

## 2024-11-26 DIAGNOSIS — G47.33 OBSTRUCTIVE SLEEP APNEA (ADULT) (PEDIATRIC): ICD-10-CM

## 2024-11-26 DIAGNOSIS — G47.30 SLEEP DISORDER BREATHING: Primary | ICD-10-CM

## 2024-11-26 PROCEDURE — 99203 OFFICE O/P NEW LOW 30 MIN: CPT | Performed by: INTERNAL MEDICINE

## 2024-11-26 PROCEDURE — 1159F MED LIST DOCD IN RCRD: CPT | Performed by: INTERNAL MEDICINE

## 2024-11-26 PROCEDURE — 1123F ACP DISCUSS/DSCN MKR DOCD: CPT | Performed by: INTERNAL MEDICINE

## 2024-11-26 NOTE — PROGRESS NOTES
NEW PATIENT VISIT-PULMONARY/SLEEP    11/26/2024     REFERRING PHYSICIAN:  Carlos Reid DO     REASON FOR REFERRAL:  Lung nodule    HPI:     Meme Bar is a 74 y.o. female who was referred to pulmonary clinic for evaluation.   She had a CT colonography which incidentally showed possible 3 mm nodule in left lower lobe.  No previous CT chest.  She is lifelong non-smoker.   Has a very mild intermittent case of asthma and uses rescue inhaler mainly few times in the winter.  Denies any respiratory issues otherwise.    Has been told she has sleep apnea after sleep study.  Has been prescribed CPAP years ago but has not been using it.           Past Medical History   Past Medical History:   Diagnosis Date    Hyperlipidemia     Hypothyroidism     Nocturia     NHI (obstructive sleep apnea)     Osteoarthritis     Polyp of corpus uteri     Stenosis of cervix     Uterine leiomyoma        Past Surgical History  Past Surgical History:   Procedure Laterality Date    HYSTEROSCOPY  06/04/2021    w/ polypectomy    TOTAL KNEE ARTHROPLASTY Right 7/24/2023    Right total knee arthroplasty, Vishal Triathlon Total Knee System, Anesthesia Requested: Choice with adductor canal block  (PAT WITH DAN 7/17/23 9:00 AM & LAB 7/17/23 AT 10:00 AM) performed by Barry Pollard MD at [facility] OR    TOTAL KNEE ARTHROPLASTY Left 1/22/2024    Left total knee arthroplasty,Vishal Triathlon Total Knee System,Choice with adductor canal block performed by Barry Pollard MD at [facility] OR    WRIST FRACTURE SURGERY  2005    left 2 pins placed       Allergies  Allergies   Allergen Reactions    Lansoprazole Diarrhea    Meloxicam     Omeprazole Diarrhea       Medications  Current Outpatient Medications   Medication Sig Dispense Refill    amoxicillin (AMOXIL) 500 MG capsule Take 4 tablets by mouth 1 hour prior to procedure and 2 tablets by mouth 6 hours after procedure 6 capsule 0    amoxicillin (AMOXIL) 500 MG capsule Take 4 tablets by mouth 1 hour

## 2025-01-28 NOTE — PROGRESS NOTES
Narrative Referring Provider:   Barry Pollard Jr      PCP:   Carlos Reid DO    ============================  IMPRESSION/PLAN:  ============================  Meme MARROQUIN Case is s/p left Total knee replacement completed on 2024.     IMPRESSION: No complaints or limitations and At normal postoperative stage of recovery    PLAN:  No new treatment indicated.  Routine follow-up.    Patient Reassurance: Normal post-operative course discussed with patient.  Patient reassured and supported. All questions answered.    Follow up 3 months no X-Rays Needed    Patient presents today for an early post-op visit    Status post op:  left Total knee replacement     BMI: There is no height or weight on file to calculate BMI.    Post-operative recovery was complicated by uneventful/none.    Patient rates their condition as improving.     Does the patient still experience pain?  4/10 pain, aching    Post Op discharge patient location: in home.   Functional Assessment is as follows: is ready to begin outpatient PT.  Functional difficulties: None.  Pain Medication: Tylenol, rare oxycodone  Currently Ambulating with: a cane    =================================  EXAM: POST OP KNEE  =================================  Left Post-Operative Knee    Ambulates with a limp favoring the left.    SKIN: Appropriate postop appearance, No evidence of erythema, warmth, discharge or drainage and Incision clean/dry/intact.    Range of motion is 3 degrees in extension and   102 degrees of flexion.    Extension La degrees    Pain with ROM: Yes with deeper flexion    There is Mild effusion.     Mal-alignment: No    Tender to the palpation of Medial femoral condyle and Medial joint line    Neurovascular Status: Sensation Intact, Moves foot and ankle up & down, 2+ dorsalis pedis and negative homans sign    Stability:Varus/Valgus- Yes, stable    Quad strength: improving    Imaging:  FINDINGS:  A total knee arthroplasty is seen with patellar

## 2025-01-29 ENCOUNTER — HOSPITAL ENCOUNTER (OUTPATIENT)
Age: 75
Discharge: HOME | End: 2025-01-31
Payer: MEDICARE

## 2025-01-29 ENCOUNTER — OFFICE VISIT (OUTPATIENT)
Dept: ORTHOPEDIC SURGERY | Age: 75
End: 2025-01-29

## 2025-01-29 ENCOUNTER — HOSPITAL ENCOUNTER (OUTPATIENT)
Dept: GENERAL RADIOLOGY | Age: 75
Discharge: HOME | End: 2025-01-31
Payer: MEDICARE

## 2025-01-29 VITALS
OXYGEN SATURATION: 97 % | HEART RATE: 92 BPM | TEMPERATURE: 96.8 F | BODY MASS INDEX: 40.85 KG/M2 | WEIGHT: 222 LBS | HEIGHT: 62 IN

## 2025-01-29 DIAGNOSIS — Z96.652 PRESENCE OF TOTAL KNEE JOINT PROSTHESIS, LEFT: Primary | ICD-10-CM

## 2025-01-29 DIAGNOSIS — Z96.652 PRESENCE OF TOTAL KNEE JOINT PROSTHESIS, LEFT: ICD-10-CM

## 2025-01-29 PROCEDURE — 99024 POSTOP FOLLOW-UP VISIT: CPT | Performed by: ORTHOPAEDIC SURGERY

## 2025-01-29 PROCEDURE — 73562 X-RAY EXAM OF KNEE 3: CPT

## 2025-02-28 RX ORDER — AMOXICILLIN 500 MG/1
CAPSULE ORAL
Qty: 6 CAPSULE | Refills: 0 | Status: SHIPPED | OUTPATIENT
Start: 2025-02-28

## 2025-03-24 RX ORDER — AMOXICILLIN 500 MG/1
CAPSULE ORAL
Qty: 6 CAPSULE | Refills: 0 | Status: SHIPPED | OUTPATIENT
Start: 2025-03-24

## 2025-03-24 NOTE — TELEPHONE ENCOUNTER
Requested Prescriptions     Pending Prescriptions Disp Refills   • amoxicillin (AMOXIL) 500 MG capsule 6 capsule 0     Sig: Take 4 tablets by mouth 1 hour prior to procedure and 2 tablets by mouth 6 hours after procedure

## 2025-05-06 RX ORDER — AMOXICILLIN 500 MG/1
CAPSULE ORAL
Qty: 6 CAPSULE | Refills: 1 | Status: SHIPPED | OUTPATIENT
Start: 2025-05-06

## 2025-05-06 NOTE — TELEPHONE ENCOUNTER
Requested Prescriptions     Signed Prescriptions Disp Refills    amoxicillin (AMOXIL) 500 MG capsule 6 capsule 1     Sig: Take 4 tablets by mouth 1 hour prior to procedure and 2 tablets by mouth 6 hours after procedure     Authorizing Provider: KATERINA ESTEBAN

## (undated) DEVICE — DRESSING HYDROFIBER AQUACEL AG ADVANTAGE 3.5X12 IN

## (undated) DEVICE — HOOD: Brand: T7PLUS

## (undated) DEVICE — GLOVE ORANGE PI 8 1/2   MSG9085

## (undated) DEVICE — SUTURE VCRL SZ 2-0 L36IN ABSRB UD L36MM CT-1 1/2 CIR J945H

## (undated) DEVICE — APPLICATOR MEDICATED 26 CC SOLUTION HI LT ORNG CHLORAPREP

## (undated) DEVICE — 4-PORT MANIFOLD: Brand: NEPTUNE 2

## (undated) DEVICE — GLOVE SURG SZ 9 THK91MIL LTX FREE SYN POLYISOPRENE ANTI

## (undated) DEVICE — HIGH FLOW TIP

## (undated) DEVICE — SUTURE MCRYL SZ 3-0 L27IN ABSRB UD L19MM PS-2 3/8 CIR PRIM Y427H

## (undated) DEVICE — SUTURE VCRL SZ 1 L36IN ABSRB UD L36MM CT-1 1/2 CIR J947H

## (undated) DEVICE — YANKAUER,BULB TIP,W/O VENT,RIGID,STERILE: Brand: MEDLINE

## (undated) DEVICE — BANDAGE COMPR M W6INXL10YD WHT BGE VELC E MTRX HK AND LOOP

## (undated) DEVICE — 3M™ STERI-DRAPE™ U-DRAPE 1015: Brand: STERI-DRAPE™

## (undated) DEVICE — MAT FLR ABSRB ECODRI-SAFE

## (undated) DEVICE — BANDAGE,ELASTIC,ESMARK,STERILE,6"X9',LF: Brand: MEDLINE

## (undated) DEVICE — FAN SPRAY KIT: Brand: PULSAVAC®

## (undated) DEVICE — PADDING CAST W6INXL4YD RAYON UNDERCAST SOF-ROL

## (undated) DEVICE — Device: Brand: STABLECUT®

## (undated) DEVICE — SYRINGE MED 50ML LUERLOCK TIP

## (undated) DEVICE — PADDING UNDERCAST W6INXL4YD RAYON POLY SYN NONADHESIVE

## (undated) DEVICE — NEEDLE SPNL 20GA L3 1 2IN YEL S STL POLYCARB HUB MTL STYL

## (undated) DEVICE — ADHESIVE SKIN CLSR 0.7ML TOP DERMBND ADV

## (undated) DEVICE — TABLE COVER: Brand: CONVERTORS

## (undated) DEVICE — SUTURE ETHBND EXCEL SZ 1 L30IN NONABSORBABLE GRN L48MM CTX X865H

## (undated) DEVICE — 3 BONE CEMENT MIXER: Brand: MIXEVAC

## (undated) DEVICE — 3M™ IOBAN™ 2 ANTIMICROBIAL INCISE DRAPE 6650EZ: Brand: IOBAN™ 2

## (undated) DEVICE — PACK CUSTOM EXTREMITY MINOR

## (undated) DEVICE — SUTURE STRATAFIX SPRL MCRYL + SZ 3-0 L18IN ABSRB UD PS-2 SXMP1B107

## (undated) DEVICE — 3 BONE CEMENT MIXER WITH SPATULA: Brand: MIXEVAC, ACM